# Patient Record
Sex: FEMALE | Race: WHITE | NOT HISPANIC OR LATINO | Employment: FULL TIME | URBAN - METROPOLITAN AREA
[De-identification: names, ages, dates, MRNs, and addresses within clinical notes are randomized per-mention and may not be internally consistent; named-entity substitution may affect disease eponyms.]

---

## 2018-04-13 ENCOUNTER — OFFICE VISIT (OUTPATIENT)
Dept: MEDICAL GROUP | Age: 38
End: 2018-04-13
Payer: COMMERCIAL

## 2018-04-13 VITALS
HEART RATE: 97 BPM | BODY MASS INDEX: 21.66 KG/M2 | DIASTOLIC BLOOD PRESSURE: 72 MMHG | WEIGHT: 130 LBS | HEIGHT: 65 IN | OXYGEN SATURATION: 99 % | SYSTOLIC BLOOD PRESSURE: 132 MMHG | TEMPERATURE: 99.3 F

## 2018-04-13 DIAGNOSIS — Z00.00 PREVENTATIVE HEALTH CARE: ICD-10-CM

## 2018-04-13 DIAGNOSIS — J45.20 MILD INTERMITTENT ASTHMA WITHOUT COMPLICATION: ICD-10-CM

## 2018-04-13 DIAGNOSIS — F32.4 MAJOR DEPRESSIVE DISORDER WITH SINGLE EPISODE, IN PARTIAL REMISSION (HCC): ICD-10-CM

## 2018-04-13 DIAGNOSIS — E78.00 PURE HYPERCHOLESTEROLEMIA: ICD-10-CM

## 2018-04-13 DIAGNOSIS — R40.0 DAYTIME SOMNOLENCE: ICD-10-CM

## 2018-04-13 PROCEDURE — 99214 OFFICE O/P EST MOD 30 MIN: CPT | Performed by: FAMILY MEDICINE

## 2018-04-13 RX ORDER — ALBUTEROL SULFATE 90 UG/1
2 AEROSOL, METERED RESPIRATORY (INHALATION) EVERY 6 HOURS PRN
Qty: 8.5 G | Refills: 3 | Status: SHIPPED | OUTPATIENT
Start: 2018-04-13

## 2018-04-13 ASSESSMENT — PATIENT HEALTH QUESTIONNAIRE - PHQ9
SUM OF ALL RESPONSES TO PHQ QUESTIONS 1-9: 9
5. POOR APPETITE OR OVEREATING: 0 - NOT AT ALL
CLINICAL INTERPRETATION OF PHQ2 SCORE: 1

## 2018-04-13 NOTE — PROGRESS NOTES
This medical record contains text that has been entered with the assistance of computer voice recognition and dictation software.  Therefore, it may contain unintended errors in text, spelling, punctuation, or grammar    Chief Complaint   Patient presents with   • Ankle Injury     Left, injured in 2016   • Sleep Disruption       Allison Kulkarni is a 38 y.o. female here evaluation and management of: Establish care, didn't somnolence, depression and anxiety, asthma, seasonal allergies      HPI:     Daytime somnolence  The patient is a 38-year-old female who presents to clinic with chief complaint of severe daytime fatigue. Sometimes when she is laughing uncontrollably she'll feel her knees give out and she'll fall. She also complains of hearing things that are not there when she is falling asleep. She denies any hypnopompic hallucinations.        Depression  Effexor Effexor 75mg po q24h  Mirtazipine 15mg po q24h    Mother commited sucicide at age 58 (unknown reason)  They were still , she tried to hang her self failed then used 350 magnum, August, 2017    Dr. Garcia is her psychiatrist q 2months    DEPRESSION SCREEN  Denied all of the following,  Depressed mood  Loss of interest or pleasure in nearly all activities  Changes in appetite or weight  Insomnia or hypersomnia,  Psychomotor agitation or retardation,  Fatigue or loss of energy,  Feelings of worthlessness or guilt,  Difficulty thinking, concentrating, or making decisions,  Recurrent thoughts of death or suicidal ideation, plans, or attempts   No Early Morning Awakenings    Preventative health care  Patient is a 38-year-old female who presents to clinic to establish care. She has a significant past medical history of asthma mild intermittent, daytime somnolence, depression and anxiety, seasonal allergies.   The patient denied any chest pain, no sob, no michelle, no  pnd, no orthopnea, no headache, no changes in vision, no numbness or tingling, no nausea, no  diarrhea, no abdominal pain, no fevers, no chills, no bright red blood per rectum, no  difficulty urinating, no burning during micturition, no depressed mood, no other concerns.        Father with MI at age 42  PGM with CHF, age uknown    Is      Asthma  The patient states that she only has symptoms when she is outdoors for long periods of time for example when she goes camping in the woods. Otherwise she doesn't use this rescue inhaler often. the patient denied intermittent cough,no nocturnal cough, wheeze, and shortness of breath brought on by characteristic triggers , no Respiratory symptoms triggered by exercise, cold air, and exposure to inhaled allergens      Current medicines (including changes today)  Current Outpatient Prescriptions   Medication Sig Dispense Refill   • albuterol (VENTOLIN HFA) 108 (90 Base) MCG/ACT Aero Soln inhalation aerosol Inhale 2 Puffs by mouth every 6 hours as needed. 8.5 g 3   • venlafaxine (EFFEXOR-XR) 150 MG extended-release capsule Take 1 Cap by mouth every day. (Patient taking differently: Take 75 mg by mouth every day.) 30 Cap 5   • mirtazapine (REMERON) 30 MG TABS tablet Take 2 Tabs by mouth every bedtime. (Patient taking differently: Take 15 mg by mouth every bedtime.) 60 Tab 5   • fluticasone (FLONASE) 50 MCG/ACT nasal spray Spray 1 Spray in nose every day. 16 g 5   • Levalbuterol HCl (XOPENEX INH) Inhale  by mouth.     • benzonatate (TESSALON) 100 MG CAPS Take 1 Cap by mouth 3 times a day as needed for Cough. 15 Cap 0     No current facility-administered medications for this visit.      She  has a past medical history of Allergy; Asthma (3/27/2015); Depression; and Seasonal allergies (3/27/2015). She also has no past medical history of ASTHMA.  She  has no past surgical history on file.  Social History   Substance Use Topics   • Smoking status: Former Smoker     Quit date: 3/27/2014   • Smokeless tobacco: Never Used   • Alcohol use Yes      Comment: occasionally  "    Social History     Social History Narrative   • No narrative on file     Family History   Problem Relation Age of Onset   • Heart Attack Father    • Heart Failure Paternal Grandmother    • Heart Attack Paternal Grandfather      Family Status   Relation Status   • Mother Alive   • Father Alive   • Maternal Grandmother    • Maternal Grandfather Alive   • Paternal Grandmother Alive   • Paternal Grandfather          ROS    Please see hpi     All other systems reviewed and are negative     Objective:     Blood pressure 132/72, pulse 97, temperature 37.4 °C (99.3 °F), height 1.651 m (5' 5\"), weight 59 kg (130 lb), SpO2 99 %, not currently breastfeeding. Body mass index is 21.63 kg/m².  Physical Exam:    Constitutional: Alert, no distress.  Skin: Warm, dry, good turgor, no rashes in visible areas.  Eye: Equal, round and reactive, conjunctiva clear, lids normal.  ENMT: Lips without lesions, good dentition, oropharynx clear.  Neck: Trachea midline, no masses, no thyromegaly. No cervical or supraclavicular lymphadenopathy.  Respiratory: Unlabored respiratory effort, lungs clear to auscultation, no wheezes, no ronchi.  Cardiovascular: Normal S1, S2, no murmur, no edema.  Abdomen: Soft, non-tender, no masses, no hepatosplenomegaly.  Psych: Alert and oriented x3, normal affect and mood.          Assessment and Plan:   The following treatment plan was discussed      1. Daytime somnolence  Clinically suspicious for narcolepsy  We will obtain sleep studies in pulmonology next time and consider stimulant    - REFERRAL TO PULMONOLOGY    2. Major depressive disorder with single episode, in partial remission ([facility])  Patient has been stable with current management  We will make no changes for now      3. Preventative health care  Care has been established  We need baseline labs to establish a clinical profile  We reviewed USPSTF guidelines    Requested Medical records to be sent to us  Denies intimate partner " "viloence      4. Pure hypercholesterolemia  Need new labs    - CBC WITH DIFFERENTIAL; Future  - LIPID PROFILE; Future  - COMP METABOLIC PANEL; Future  - TSH WITH REFLEX TO FT4; Future    5. Mild intermittent asthma without complication    Discussed the goals of treatment,    #1Routine monitoring of symptoms and lung function--Peak expiratory flow rate (PEFR) (performed in the office and/or at home) and spirometry (performed in the office)  #2 Patient education on Asthma Action plan  #3 Controlling environmental factors (trigger factors) and comorbid conditions that contribute to asthma severity  #4 Pharmacologic therapy--Rescue inhaler as needed and daily Advair  #5 Well-controlled asthma is characterized by daytime symptoms no more than twice per week and nighttime symptoms no more than twice per month.  #6Asthma action plan -- The patient's normal PEFR value can be used to maintain personalized \"asthma action plan\"  #7The identification and avoidance of asthma \"triggers\" is a critical component of successful asthma management  #8 Non-selective beta-blockers can trigger severe asthmatic attacks      - albuterol (VENTOLIN HFA) 108 (90 Base) MCG/ACT Aero Soln inhalation aerosol; Inhale 2 Puffs by mouth every 6 hours as needed.  Dispense: 8.5 g; Refill: 3        HEALTH MAINTENANCE:    Instructed to Follow up in clinic or ER for worsening symptoms, difficulty breathing, lack of expected recovery, or should new symptoms or problems arise.    Followup: Return in about 4 weeks (around 5/11/2018) for EXCISION.       Once again this medical record contains text that has been entered with the assistance of computer voice recognition and dictation software.  Therefore, it may contain unintended errors in text, spelling, punctuation, or grammar         "

## 2018-04-13 NOTE — ASSESSMENT & PLAN NOTE
Effexor Effexor 75mg po q24h  Mirtazipine 15mg po q24h    Mother commited sucicide at age 58 (unknown reason)  They were still , she tried to hang her self failed then used 350 magnum, August, 2017    Dr. Garcia is her psychiatrist q 2months    DEPRESSION SCREEN  Denied all of the following,  Depressed mood  Loss of interest or pleasure in nearly all activities  Changes in appetite or weight  Insomnia or hypersomnia,  Psychomotor agitation or retardation,  Fatigue or loss of energy,  Feelings of worthlessness or guilt,  Difficulty thinking, concentrating, or making decisions,  Recurrent thoughts of death or suicidal ideation, plans, or attempts   No Early Morning Awakenings

## 2018-04-13 NOTE — ASSESSMENT & PLAN NOTE
The patient states that she only has symptoms when she is outdoors for long periods of time for example when she goes camping in the woods. Otherwise she doesn't use this rescue inhaler often. the patient denied intermittent cough,no nocturnal cough, wheeze, and shortness of breath brought on by characteristic triggers , no Respiratory symptoms triggered by exercise, cold air, and exposure to inhaled allergens

## 2018-04-13 NOTE — ASSESSMENT & PLAN NOTE
Patient is a 38-year-old female who presents to clinic to establish care. She has a significant past medical history of asthma mild intermittent, daytime somnolence, depression and anxiety, seasonal allergies.   The patient denied any chest pain, no sob, no michelle, no  pnd, no orthopnea, no headache, no changes in vision, no numbness or tingling, no nausea, no diarrhea, no abdominal pain, no fevers, no chills, no bright red blood per rectum, no  difficulty urinating, no burning during micturition, no depressed mood, no other concerns.        Father with MI at age 42  PGM with CHF, age uknown    Is

## 2018-04-13 NOTE — ASSESSMENT & PLAN NOTE
The patient is a 38-year-old female who presents to clinic with chief complaint of severe daytime fatigue. Sometimes when she is laughing uncontrollably she'll feel her knees give out and she'll fall. She also complains of hearing things that are not there when she is falling asleep. She denies any hypnopompic hallucinations.

## 2018-04-18 DIAGNOSIS — R40.0 DAYTIME SOMNOLENCE: ICD-10-CM

## 2018-04-20 ENCOUNTER — SLEEP CENTER VISIT (OUTPATIENT)
Dept: SLEEP MEDICINE | Facility: MEDICAL CENTER | Age: 38
End: 2018-04-20
Payer: COMMERCIAL

## 2018-04-20 VITALS
DIASTOLIC BLOOD PRESSURE: 72 MMHG | SYSTOLIC BLOOD PRESSURE: 118 MMHG | OXYGEN SATURATION: 99 % | HEART RATE: 80 BPM | HEIGHT: 65 IN | WEIGHT: 129 LBS | RESPIRATION RATE: 16 BRPM | BODY MASS INDEX: 21.49 KG/M2

## 2018-04-20 DIAGNOSIS — G47.10 HYPERSOMNOLENCE DISORDER, PERSISTENT: ICD-10-CM

## 2018-04-20 DIAGNOSIS — F32.9 REACTIVE DEPRESSION: ICD-10-CM

## 2018-04-20 PROCEDURE — 99243 OFF/OP CNSLTJ NEW/EST LOW 30: CPT | Performed by: INTERNAL MEDICINE

## 2018-04-20 ASSESSMENT — ENCOUNTER SYMPTOMS: SLEEP DISTURBANCE: 0

## 2018-04-20 NOTE — PROGRESS NOTES
"Chief Complaint   Patient presents with   • New Patient     Sleep Consultation       HPI: This patient is a 38 y.o. Female who is referred for hypersomnolence. She describes \"always feeling tired\" with frequent nighttime awakenings. She dates the onset of symptoms since puberty. Sometimes when she is laughing uncontrollably her legs give out and she falls. This has also happened to her with \"panic attacks\". She sees or hears things that are not there when she is falling asleep. In terms of sleep habits, she goes to bed by 8 PM, falling asleep within 15 minutes. She currently takes mirtazapine at bedtime. She will awaken within 2 hours, and has subsequent frequent brief nighttime awakenings for no specific reason. She estimates this happens up to 6 times nightly. Denies snoring, restless legs or apneas. She gets up by 4:45 AM, feeling tired. She drinks 2 large mugs of coffee daily. She feels drowsy through the day however denies sleep attacks. Denies tobacco, alcohol, or recreational drug use. She has been prescribed Effexor and mirtazapine for depression over the past 6 months. She denies any change in symptomatology with these medications.  She had been evaluated through [facility] for narcolepsy evaluation in the past however never pursued her sleep study.      Past Medical History:   Diagnosis Date   • Allergy    • Asthma 3/27/2015   • Back pain    • Bronchitis    • Chickenpox    • Depression    • Nasal drainage    • Seasonal allergies 3/27/2015       Social History     Social History   • Marital status: Single     Spouse name: N/A   • Number of children: N/A   • Years of education: N/A     Occupational History   • Not on file.     Social History Main Topics   • Smoking status: Former Smoker     Packs/day: 0.50     Years: 10.00     Quit date: 3/27/2014   • Smokeless tobacco: Never Used      Comment: currently uses vape   • Alcohol use Yes      Comment: occasionally   • Drug use: No   • Sexual activity: Not on file " "    Other Topics Concern   • Not on file     Social History Narrative   • No narrative on file       Family History   Problem Relation Age of Onset   • Other Mother      sleep walking   • Heart Attack Father    • Heart Failure Paternal Grandmother    • Heart Attack Paternal Grandfather        Current Outpatient Prescriptions on File Prior to Visit   Medication Sig Dispense Refill   • venlafaxine (EFFEXOR-XR) 150 MG extended-release capsule Take 1 Cap by mouth every day. (Patient taking differently: Take 75 mg by mouth every day.) 30 Cap 5   • mirtazapine (REMERON) 30 MG TABS tablet Take 2 Tabs by mouth every bedtime. (Patient taking differently: Take 15 mg by mouth every bedtime.) 60 Tab 5   • albuterol (VENTOLIN HFA) 108 (90 Base) MCG/ACT Aero Soln inhalation aerosol Inhale 2 Puffs by mouth every 6 hours as needed. 8.5 g 3   • fluticasone (FLONASE) 50 MCG/ACT nasal spray Spray 1 Spray in nose every day. 16 g 5   • benzonatate (TESSALON) 100 MG CAPS Take 1 Cap by mouth 3 times a day as needed for Cough. 15 Cap 0     No current facility-administered medications on file prior to visit.        Allergies: Latex    ROS:   Constitutional: Denies fevers, chills, night sweats, fatigue or weight loss  Eyes: Denies vision loss, pain, drainage, double vision  Ears, Nose, Throat: Denies earache, difficulty hearing, tinnitus, nasal congestion, hoarseness  Cardiovascular: Denies chest pain, tightness, palpitations, orthopnea or edema  Respiratory: Denies shortness of breath, cough, wheezing, hemoptysis  Sleep: As in history of present illness  GI: Denies heartburn, dysphagia, nausea, abdominal pain, diarrhea or constipation  : Denies frequent urination, hematuria, discharge or painful urination  Musculoskeletal: Denies back pain, painful joints, sore muscles  Neurological: Denies weakness or headaches  Skin: No rashes    Blood pressure 118/72, pulse 80, resp. rate 16, height 1.651 m (5' 5\"), weight 58.5 kg (129 lb), SpO2 99 " %.    Physical Exam:  Appearance: Well-nourished, well-developed, thin, in no acute distress  HEENT: Normocephalic, atraumatic, white sclera, PERRLA, oropharynx clear, Mallampati 2  Neck: No adenopathy or masses  Respiratory: no intercostal retractions or accessory muscle use  Lungs auscultation: Clear to auscultation bilaterally  Cardiovascular: Regular rate rhythm. No murmurs, rubs or gallops.  No LE edema  Abdomen: soft, nondistended  Gait: Normal  Digits: No clubbing, cyanosis  Motor: No focal deficits  Orientation: Oriented to time, person and place    Diagnosis:  1. Hypersomnolence disorder, persistent  NARCOLEPSY GENOTYPING    MULTIPLE SLEEP LATENCY TEST    POLYSOMNOGRAPHY, 4 OR MORE   2. Reactive depression         Plan:  The patient describes daytime hypersomnolence, hypnagogic hallucinations and cataplexy symptoms, suspicious for narcolepsy. She is currently on mirtazapine and effexor which can cause sedation, however her symptoms have long preceded the use of these medications. She has skipped mirtazapine previously for a day without any adverse effects, and will hold off on mirtazapine the night of her sleep study.  Recommend evaluation with baseline polysomnography followed by multiple sleep latency testing. Obtain narcolepsy genotyping.  She is cautioned against driving when hypersomnolent.  Return for after sleep study/MSLT.      Answers for HPI/ROS submitted by the patient on 4/20/2018   Year of your last physical exam: 2018  Occupation :   Height: 5ft 5in  Current weight: 130  6 months ago: 120  At age 21: 105  What is the reason for your visit today?: Narcolepsy evaluation  Name of person referring you to the Sleep Center: Dr. Campbell  Have you ever been hospitalized?: No  Have you ever had problems with anesthesia?: Yes  Have you experienced post-operative delirium?: No  Any complications with surgery?: No  What year did you receive your last Flu shot?: Never  What year did you receive  you last Pneumonia shot?: Never  Have you had a TB skin test? If so, please list the year and result:: No  Have you had Allergy skin testing? If so, please list the year and result:: No  Please briefly describe your sleep problem and how old you were when it began.: always tired, hypnogogic hallucinations, frequent waking. Began as a teenager.  How does this affect your daily life and activities? Please also rate how serious of a problem this is (1 = Not at all, 10 = Very Serious).: 7  Have you had any previous evaluations, examinations, or treatment for this sleep problem or any other problems with your sleep? If so, please describe the evaluation, treatment, and results.: Saw a sleep specialist who thought I might have narcolepsy, but was unable to do the sleep study at that time.  Have you used any medications (prescribed or otherwise) to help your sleep problem? If yes, include name, amount, frequency, and the prescribing physician.: mirtazipine, 15-30mg nightly.  If employed, what time do you usually start and end work?: 6:15 am to 5:15 pm (4 days a week)  Do you ever change work shifts? If yes, describe how often (never, infrequently, regularly).: only by an hour or two.  What time do you usually go to bed and wake up on: Weekdays? Weekends?: Work week - bed at 8pm, wake at 4:45am.  Weekends bed at 9 or 10pm, wake between 7 and 8am.  Do you have a regular bed partner?: Yes  How many minutes does it usually take to fall asleep at night after turning off the lights?: 15-20  What do you ordinarily do just prior to turning out the lights and attempting to go to sleep (e.g., reading, TV, baths, etc.)?: Look at my phone (next day's weather, facebook, etc)  On average, how many times do you wake up during the night?: 3-6  On average, how many times do you wake up to use the bathroom?: I usually don't  Do you often wake up too early in the morning and are unable to return to sleep?: no  On average, how many hours of  sleep do you get per night?: 8.5  not counting the times I wake up.  How do you usually awaken?  Alarm, spontaneously, or other?: Alarm  Is it difficult for you to awaken and get out of bed after sleeping? (Not at all, Sometimes, Very): Sometimes  Do you nap or return to bed after arising?: No  Are you bothered by sleepiness during the day?: Yes  Do you feel that you get too much sleep at night?: No  Do you feel that you get too little sleep at night?: Yes  Do you usually feel tired during the day? If so, what do you attribute this to?: Yes. Poor sleep  Do you find yourself falling asleep when you don't mean to? : Very rarely  If yes, how long does your sleep episode last?: A few minutes  Do you feel rested or refreshed after the sleep episode?: No  Have you ever suddenly fallen?: Yes  Have you ever experienced sudden body weakness?: Yes  If yes, were you aware of the things around you?: Yes  Was the weakness brought on by any particular event or feeling? If so, briefly describe.: Laughing very hard, panic attacks.  Have you ever experienced weakness or paralysis upon going to sleep?: Yes  Have you ever experienced weakness or paralysis upon awakening from sleep?: Yes  If yes for either of the above two questions, please indicate how many times per week does this occur?: A couple times a week when falling asleep. Rarely upon waking.  Have you ever experienced seeing things or hearing voices/noises: That weren't real? On going to sleep? During the night? On awakening from sleep? During the day?: Yes, when going to sleep.  Do you have difficulty breathing at night? If yes, briefly describe.: No  Have you been told you snore while asleep? If so, does it disturb a bed partner (or someone in the same room), or someone in the next room?: Only when I have a cold.  Have you ever experienced doing something without being aware of the action? If yes, please describe.: No  Have you ever experienced upon lying in bed before  "sleep or on awakening from sleep: Restlessness of legs, \"nervous legs\", \"creeping crawling\" sensation of legs, or twitching of legs?: Yes  How many times per week does this occur, and how many minutes does the sensation last?: Occasionally, maybe once a month or so  Does anything relieve the sensations (e.g., getting out of bed, medication, massage)?: Yes  At what age did this first occur, and how many years has this occurred?: Started in my early twenties  Have you ever been told that your arms or legs jerk or twitch while you are asleep? If yes, how many times per night does this occur?: Yes. Not every night  At what age did this first occur, and how many years has this occurred?: Started in my early twenties  Does this seem to awaken you from your sleep?: No  Do you know, or have you ever been told that you do any of the following while sleeping: talk, walk, grit teeth, wet the bed, wake up screaming or seemingly afraid, have disturbing dreams, have unusual movements, wake up with headaches, (males) have erections? If yes to any of these, please indicate how many times per week, age started, last occurrence, treatment received.: I sometimes talk in my sleep, maybe once a week. I used to sleep walk occasionally, started around age 12 or 13, stopped around age 22 or 23.  Has anyone in your family been known to have any sleep problems? If yes, please list the type of problem (e.g., trouble getting to sleep, too sleepy, bed wetting, etc.), the relationship of this person to you, and the treatment received.: My mother used to sleep walk, stopped in her twenties    Answers for HPI/ROS submitted by the patient on 4/20/2018   Year of your last physical exam: 2018  Occupation :   Height: 5ft 5in  Current weight: 130  6 months ago: 120  At age 21: 105  What is the reason for your visit today?: Narcolepsy evaluation  Name of person referring you to the Sleep Center: Dr. Campbell  Have you ever been hospitalized?: " No  Have you ever had problems with anesthesia?: Yes  Have you experienced post-operative delirium?: No  Any complications with surgery?: No  What year did you receive your last Flu shot?: Never  What year did you receive you last Pneumonia shot?: Never  Have you had a TB skin test? If so, please list the year and result:: No  Have you had Allergy skin testing? If so, please list the year and result:: No  Please briefly describe your sleep problem and how old you were when it began.: always tired, hypnogogic hallucinations, frequent waking. Began as a teenager.  How does this affect your daily life and activities? Please also rate how serious of a problem this is (1 = Not at all, 10 = Very Serious).: 7  Have you had any previous evaluations, examinations, or treatment for this sleep problem or any other problems with your sleep? If so, please describe the evaluation, treatment, and results.: Saw a sleep specialist who thought I might have narcolepsy, but was unable to do the sleep study at that time.  Have you used any medications (prescribed or otherwise) to help your sleep problem? If yes, include name, amount, frequency, and the prescribing physician.: mirtazipine, 15-30mg nightly.  If employed, what time do you usually start and end work?: 6:15 am to 5:15 pm (4 days a week)  Do you ever change work shifts? If yes, describe how often (never, infrequently, regularly).: only by an hour or two.  What time do you usually go to bed and wake up on: Weekdays? Weekends?: Work week - bed at 8pm, wake at 4:45am.  Weekends bed at 9 or 10pm, wake between 7 and 8am.  Do you have a regular bed partner?: Yes  How many minutes does it usually take to fall asleep at night after turning off the lights?: 15-20  What do you ordinarily do just prior to turning out the lights and attempting to go to sleep (e.g., reading, TV, baths, etc.)?: Look at my phone (next day's weather, facebook, etc)  On average, how many times do you wake up  during the night?: 3-6  On average, how many times do you wake up to use the bathroom?: I usually don't  Do you often wake up too early in the morning and are unable to return to sleep?: no  On average, how many hours of sleep do you get per night?: 8.5  not counting the times I wake up.  How do you usually awaken?  Alarm, spontaneously, or other?: Alarm  Is it difficult for you to awaken and get out of bed after sleeping? (Not at all, Sometimes, Very): Sometimes  Do you nap or return to bed after arising?: No  Are you bothered by sleepiness during the day?: Yes  Do you feel that you get too much sleep at night?: No  Do you feel that you get too little sleep at night?: Yes  Do you usually feel tired during the day? If so, what do you attribute this to?: Yes. Poor sleep  Do you find yourself falling asleep when you don't mean to? : Very rarely  If yes, how long does your sleep episode last?: A few minutes  Do you feel rested or refreshed after the sleep episode?: No  Have you ever suddenly fallen?: Yes  Have you ever experienced sudden body weakness?: Yes  If yes, were you aware of the things around you?: Yes  Was the weakness brought on by any particular event or feeling? If so, briefly describe.: Laughing very hard, panic attacks.  Have you ever experienced weakness or paralysis upon going to sleep?: Yes  Have you ever experienced weakness or paralysis upon awakening from sleep?: Yes  If yes for either of the above two questions, please indicate how many times per week does this occur?: A couple times a week when falling asleep. Rarely upon waking.  Have you ever experienced seeing things or hearing voices/noises: That weren't real? On going to sleep? During the night? On awakening from sleep? During the day?: Yes, when going to sleep.  Do you have difficulty breathing at night? If yes, briefly describe.: No  Have you been told you snore while asleep? If so, does it disturb a bed partner (or someone in the same  "room), or someone in the next room?: Only when I have a cold.  Have you ever experienced doing something without being aware of the action? If yes, please describe.: No  Have you ever experienced upon lying in bed before sleep or on awakening from sleep: Restlessness of legs, \"nervous legs\", \"creeping crawling\" sensation of legs, or twitching of legs?: Yes  How many times per week does this occur, and how many minutes does the sensation last?: Occasionally, maybe once a month or so  Does anything relieve the sensations (e.g., getting out of bed, medication, massage)?: Yes  At what age did this first occur, and how many years has this occurred?: Started in my early twenties  Have you ever been told that your arms or legs jerk or twitch while you are asleep? If yes, how many times per night does this occur?: Yes. Not every night  At what age did this first occur, and how many years has this occurred?: Started in my early twenties  Does this seem to awaken you from your sleep?: No  Do you know, or have you ever been told that you do any of the following while sleeping: talk, walk, grit teeth, wet the bed, wake up screaming or seemingly afraid, have disturbing dreams, have unusual movements, wake up with headaches, (males) have erections? If yes to any of these, please indicate how many times per week, age started, last occurrence, treatment received.: I sometimes talk in my sleep, maybe once a week. I used to sleep walk occasionally, started around age 12 or 13, stopped around age 22 or 23.  Has anyone in your family been known to have any sleep problems? If yes, please list the type of problem (e.g., trouble getting to sleep, too sleepy, bed wetting, etc.), the relationship of this person to you, and the treatment received.: My mother used to sleep walk, stopped in her twenties    "

## 2018-04-27 ENCOUNTER — HOSPITAL ENCOUNTER (OUTPATIENT)
Dept: LAB | Facility: MEDICAL CENTER | Age: 38
End: 2018-04-27
Attending: INTERNAL MEDICINE
Payer: COMMERCIAL

## 2018-04-27 DIAGNOSIS — G47.10 HYPERSOMNOLENCE DISORDER, PERSISTENT: ICD-10-CM

## 2018-04-27 PROCEDURE — 81383 HLA II TYPING 1 ALLELE HR: CPT

## 2018-04-27 PROCEDURE — 36415 COLL VENOUS BLD VENIPUNCTURE: CPT

## 2018-05-03 LAB
HLA-DQB1 QL: NEGATIVE
NARCOLEPSY SPEC Q0167: NORMAL

## 2018-05-04 ENCOUNTER — HOSPITAL ENCOUNTER (OUTPATIENT)
Dept: LAB | Facility: MEDICAL CENTER | Age: 38
End: 2018-05-04
Attending: FAMILY MEDICINE
Payer: COMMERCIAL

## 2018-05-04 DIAGNOSIS — E78.00 PURE HYPERCHOLESTEROLEMIA: ICD-10-CM

## 2018-05-04 LAB
ALBUMIN SERPL BCP-MCNC: 3.7 G/DL (ref 3.2–4.9)
ALBUMIN/GLOB SERPL: 1.2 G/DL
ALP SERPL-CCNC: 44 U/L (ref 30–99)
ALT SERPL-CCNC: 23 U/L (ref 2–50)
ANION GAP SERPL CALC-SCNC: 6 MMOL/L (ref 0–11.9)
AST SERPL-CCNC: 17 U/L (ref 12–45)
BASOPHILS # BLD AUTO: 0.8 % (ref 0–1.8)
BASOPHILS # BLD: 0.05 K/UL (ref 0–0.12)
BILIRUB SERPL-MCNC: 0.6 MG/DL (ref 0.1–1.5)
BUN SERPL-MCNC: 15 MG/DL (ref 8–22)
CALCIUM SERPL-MCNC: 8.7 MG/DL (ref 8.5–10.5)
CHLORIDE SERPL-SCNC: 107 MMOL/L (ref 96–112)
CHOLEST SERPL-MCNC: 133 MG/DL (ref 100–199)
CO2 SERPL-SCNC: 26 MMOL/L (ref 20–33)
CREAT SERPL-MCNC: 0.65 MG/DL (ref 0.5–1.4)
EOSINOPHIL # BLD AUTO: 0.15 K/UL (ref 0–0.51)
EOSINOPHIL NFR BLD: 2.5 % (ref 0–6.9)
ERYTHROCYTE [DISTWIDTH] IN BLOOD BY AUTOMATED COUNT: 40.9 FL (ref 35.9–50)
GLOBULIN SER CALC-MCNC: 3 G/DL (ref 1.9–3.5)
GLUCOSE SERPL-MCNC: 88 MG/DL (ref 65–99)
HCT VFR BLD AUTO: 41.8 % (ref 37–47)
HDLC SERPL-MCNC: 66 MG/DL
HGB BLD-MCNC: 13.5 G/DL (ref 12–16)
IMM GRANULOCYTES # BLD AUTO: 0.02 K/UL (ref 0–0.11)
IMM GRANULOCYTES NFR BLD AUTO: 0.3 % (ref 0–0.9)
LDLC SERPL CALC-MCNC: 58 MG/DL
LYMPHOCYTES # BLD AUTO: 2.44 K/UL (ref 1–4.8)
LYMPHOCYTES NFR BLD: 40.9 % (ref 22–41)
MCH RBC QN AUTO: 28.2 PG (ref 27–33)
MCHC RBC AUTO-ENTMCNC: 32.3 G/DL (ref 33.6–35)
MCV RBC AUTO: 87.3 FL (ref 81.4–97.8)
MONOCYTES # BLD AUTO: 0.54 K/UL (ref 0–0.85)
MONOCYTES NFR BLD AUTO: 9 % (ref 0–13.4)
NEUTROPHILS # BLD AUTO: 2.77 K/UL (ref 2–7.15)
NEUTROPHILS NFR BLD: 46.5 % (ref 44–72)
NRBC # BLD AUTO: 0 K/UL
NRBC BLD-RTO: 0 /100 WBC
PLATELET # BLD AUTO: 282 K/UL (ref 164–446)
PMV BLD AUTO: 10.9 FL (ref 9–12.9)
POTASSIUM SERPL-SCNC: 4 MMOL/L (ref 3.6–5.5)
PROT SERPL-MCNC: 6.7 G/DL (ref 6–8.2)
RBC # BLD AUTO: 4.79 M/UL (ref 4.2–5.4)
SODIUM SERPL-SCNC: 139 MMOL/L (ref 135–145)
TRIGL SERPL-MCNC: 43 MG/DL (ref 0–149)
TSH SERPL DL<=0.005 MIU/L-ACNC: 1.22 UIU/ML (ref 0.38–5.33)
WBC # BLD AUTO: 6 K/UL (ref 4.8–10.8)

## 2018-05-04 PROCEDURE — 84443 ASSAY THYROID STIM HORMONE: CPT

## 2018-05-04 PROCEDURE — 80061 LIPID PANEL: CPT

## 2018-05-04 PROCEDURE — 85025 COMPLETE CBC W/AUTO DIFF WBC: CPT

## 2018-05-04 PROCEDURE — 36415 COLL VENOUS BLD VENIPUNCTURE: CPT

## 2018-05-04 PROCEDURE — 80053 COMPREHEN METABOLIC PANEL: CPT

## 2018-05-18 ENCOUNTER — HOSPITAL ENCOUNTER (OUTPATIENT)
Facility: MEDICAL CENTER | Age: 38
End: 2018-05-18
Attending: FAMILY MEDICINE
Payer: COMMERCIAL

## 2018-05-18 ENCOUNTER — OFFICE VISIT (OUTPATIENT)
Dept: MEDICAL GROUP | Age: 38
End: 2018-05-18
Payer: COMMERCIAL

## 2018-05-18 VITALS
OXYGEN SATURATION: 92 % | BODY MASS INDEX: 21.66 KG/M2 | TEMPERATURE: 99 F | SYSTOLIC BLOOD PRESSURE: 124 MMHG | HEIGHT: 65 IN | DIASTOLIC BLOOD PRESSURE: 62 MMHG | WEIGHT: 130 LBS | HEART RATE: 100 BPM

## 2018-05-18 DIAGNOSIS — D48.9 NEOPLASM OF UNCERTAIN BEHAVIOR: ICD-10-CM

## 2018-05-18 PROCEDURE — 88305 TISSUE EXAM BY PATHOLOGIST: CPT

## 2018-05-18 PROCEDURE — 99000 SPECIMEN HANDLING OFFICE-LAB: CPT | Performed by: FAMILY MEDICINE

## 2018-05-18 PROCEDURE — 11401 EXC TR-EXT B9+MARG 0.6-1 CM: CPT | Performed by: FAMILY MEDICINE

## 2018-05-18 NOTE — ASSESSMENT & PLAN NOTE
The patient is a 38-year-old female who complains of an increasing annoying mole on mid chest which often gets caught in clothing bleeds and is very uncomfortable.  She is also concerned about malignancy and wants this removed.

## 2018-05-18 NOTE — PROGRESS NOTES
This medical record contains text that has been entered with the assistance of computer voice recognition and dictation software.  Therefore, it may contain unintended errors in text, spelling, punctuation, or grammar    Chief Complaint   Patient presents with   • Biopsy         Allison Kulkarni is a 38 y.o. female here evaluation and management of: mole      HPI:     Neoplasm of uncertain behavior  The patient is a 38-year-old female who complains of an increasing annoying mole on mid chest which often gets caught in clothing bleeds and is very uncomfortable.  She is also concerned about malignancy and wants this removed.    Current medicines (including changes today)  Current Outpatient Prescriptions   Medication Sig Dispense Refill   • Multiple Vitamin (MULTI-VITAMIN PO) Take  by mouth.     • Cholecalciferol (VITAMIN D PO) Take  by mouth.     • venlafaxine (EFFEXOR-XR) 150 MG extended-release capsule Take 1 Cap by mouth every day. (Patient taking differently: Take 75 mg by mouth every day.) 30 Cap 5   • mirtazapine (REMERON) 30 MG TABS tablet Take 2 Tabs by mouth every bedtime. (Patient taking differently: Take 15 mg by mouth every bedtime.) 60 Tab 5   • albuterol (VENTOLIN HFA) 108 (90 Base) MCG/ACT Aero Soln inhalation aerosol Inhale 2 Puffs by mouth every 6 hours as needed. 8.5 g 3   • fluticasone (FLONASE) 50 MCG/ACT nasal spray Spray 1 Spray in nose every day. 16 g 5   • benzonatate (TESSALON) 100 MG CAPS Take 1 Cap by mouth 3 times a day as needed for Cough. 15 Cap 0     No current facility-administered medications for this visit.      She  has a past medical history of Allergy; Asthma (3/27/2015); Back pain; Bronchitis; Chickenpox; Depression; Nasal drainage; and Seasonal allergies (3/27/2015). She also has no past medical history of ASTHMA.  She  has no past surgical history on file.  Social History   Substance Use Topics   • Smoking status: Former Smoker     Packs/day: 0.50     Years: 10.00      "Quit date: 3/27/2014   • Smokeless tobacco: Never Used      Comment: currently uses vape   • Alcohol use Yes      Comment: occasionally     Social History     Social History Narrative   • No narrative on file     Family History   Problem Relation Age of Onset   • Other Mother      sleep walking   • Heart Attack Father    • Heart Failure Paternal Grandmother    • Heart Attack Paternal Grandfather      Family Status   Relation Status   • Mother Alive   • Father Alive   • Maternal Grandmother    • Maternal Grandfather Alive   • Paternal Grandmother Alive   • Paternal Grandfather          ROS    Please see hpi     All other systems reviewed and are negative     Objective:     Blood pressure 124/62, pulse 100, temperature 37.2 °C (99 °F), height 1.651 m (5' 5\"), weight 59 kg (130 lb), SpO2 92 %, not currently breastfeeding. Body mass index is 21.63 kg/m².  Physical Exam:    Constitutional: Alert, no distress.  Skin: Warm, dry, good turgor, no rashes in visible areas.  Eye: Equal, round and reactive, conjunctiva clear, lids normal.  ENMT: Lips without lesions, good dentition, oropharynx clear.  Neck: Trachea midline, no masses, no thyromegaly. No cervical or supraclavicular lymphadenopathy.  Respiratory: Unlabored respiratory effort, lungs clear to auscultation, no wheezes, no ronchi.  Cardiovascular: Normal S1, S2, no murmur, no edema.  Abdomen: Soft, non-tender, no masses, no hepatosplenomegaly.  Psych: Alert and oriented x3, normal affect and mood.        Excision---4mm, irregular, assymmetric, hyperpigmented papule on mid chest    Dermoscopy revealed  Pigmented  globules, blotches,     Final Length of Excision--6mm        After informed written consent was obtained, using Betadine for cleansing and 1% Lidocaine w- epinephrine for anesthetic, with sterile technique a 6 mm punch tool was used to obtain and excise  the lesion through dermis (full thickness) . Intent was to remove entire lesion with " margins . Hemostasis was obtained by pressure and wound was   Sutured  With 3.0 Ethilon x2  Antibiotic dressing is applied, and wound care instructions provided. Be alert for any signs of cutaneous infection. The specimen is labeled and sent to pathology for evaluation. The procedure was well tolerated without complications.    Assessment and Plan:   The following treatment plan was discussed      1. Neoplasm of uncertain behavior    Patient tollerrated procedure well  There were no adverse events  Patient was given post procedure precautions     - PATHOLOGY SPECIMEN; Future        HEALTH MAINTENANCE:    Instructed to Follow up in clinic or ER for worsening symptoms, difficulty breathing, lack of expected recovery, or should new symptoms or problems arise.    Followup: Return in about 10 days (around 5/28/2018) for Suture Removal with MA.       Once again this medical record contains text that has been entered with the assistance of computer voice recognition and dictation software.  Therefore, it may contain unintended errors in text, spelling, punctuation, or grammar

## 2018-05-21 ENCOUNTER — SLEEP STUDY (OUTPATIENT)
Dept: SLEEP MEDICINE | Facility: MEDICAL CENTER | Age: 38
End: 2018-05-21
Attending: INTERNAL MEDICINE
Payer: COMMERCIAL

## 2018-05-21 DIAGNOSIS — G47.10 HYPERSOMNOLENCE DISORDER, PERSISTENT: ICD-10-CM

## 2018-05-21 PROCEDURE — 95810 POLYSOM 6/> YRS 4/> PARAM: CPT | Performed by: FAMILY MEDICINE

## 2018-05-22 ENCOUNTER — SLEEP STUDY (OUTPATIENT)
Dept: SLEEP MEDICINE | Facility: MEDICAL CENTER | Age: 38
End: 2018-05-22
Attending: INTERNAL MEDICINE
Payer: COMMERCIAL

## 2018-05-22 DIAGNOSIS — G47.10 HYPERSOMNOLENCE DISORDER, PERSISTENT: ICD-10-CM

## 2018-05-22 NOTE — PROCEDURES
Clinical Comments:  The patient underwent a comprehensive polysomnogram using the standard montage for measurement of parameters of sleep, respiratory events, movement abnormalities, heart rate and rhythm. A microphone was used to monitor snoring.        INTERPRETATION:  The total recording time was 584.4 minutes with a sleep period of 420.0 minutes and the total sleep time was 402.5 minutes with a sleep efficiency of 68.9%.  The sleep latency was 20.0 minutes, and REM latency was 133.0 minutes.  The patient experienced 170 arousals in total, for an arousal index of 25.3     RESPIRATORY: The patient had 0 apneas in total.  Of these, 0 were obstructive apneas, and 0 were central apneas.  This resulted in an apnea index (AI) of 0.0.  The patient had 2 hypopneas, for a hypopnea index of 0.3.  The overall AHI was 0.3, while the AHI during Stage R sleep was 0.0.  AHI while supine was 5.5.     OXIMETRY: Oxygen saturation monitoring showed a mean SpO2 of 96.4%, with a minimum oxygen saturation of 93.0%.  Oxygen saturations were less than or = 89% for 5.5 minutes of sleep time.     CARDIAC: The highest heart rate during the recording was 100.0 beats per minute.  The average heart rate during sleep was 100.0 bpm.     LIMB MOVEMENTS: There were a total of 242 PLMs during sleep, of which 19 were PLMs arousals.  This resulted in a PLMS index of 36.1.    Technical summary:The patient underwent a diagnostic polysomnogram. This was a 16 channel montage study to include a 6 channel EEG, a 2 channel EOG, and chin EMG, left and right leg EMG, a snore channel, a nasal pressure transducer, and a nasal oral airflow semester.   Respiratory effort was assessed with the use of a thoracic and abdominal monitor and overnight oximetry was obtained. Audio and video recordings were reviewed. This was a fully attended study and sleep stage scoring was performed. The test was technically adequate.     General sleep summary:  General sleep  summary:  During the overnight study, the sleep latency was 75 min which is prolonged. The REM latency was 68 min which is normal. The total sleep time was 402 min and sleep efficiency was 68 % which is decreased.  Sleep stage proportions showed no N3 sleep and increased WASO of 106 min.  In regards to sleep quality there was a moderate degree of sleep fragmentation as shown by the arousal index of 25 an hourThe arousals were due to spontaneous arousal.    Respiratory summary: During the overnight study, the patient demonstrated normal apnea hypopnea index of 0.3/hr. There was a total of 0 apneas and 2 hypopneas. In the supine position the respiratory disturbance index was 0 an hour and in the non-supine position the respiratory disturbance index was 1.1 per hour. The respiratory events were associated with O2 nakul of 93% and average O2 saturation of 96%. She spent 0 % of sleep time below 89% O2 saturation. There was snoring. The patient demonstrated a NSR with an average heartbeat of 53 bpm.     Periodic limb movement summary: The patient demonstrated an mild degree of  limb movements as shown by the LM index of 36.1 per hour.      Impression:  1.  hypersomnolence   2.  Normal AHI   3.  No SOREMPs    Recommendations:  Clinical correlation is required. This study does not suggest clinically significant obstructive sleep apnea  with a normal AHI of 0.3/hr. Pt should be cautioned against drowsy driving and operating heavy machinery. If she feels sleepy while driving, she must pull over for a break/nap, rather than persist on the road, in the interest of her own safety and that of others on the road.

## 2018-05-25 ENCOUNTER — SLEEP CENTER VISIT (OUTPATIENT)
Dept: SLEEP MEDICINE | Facility: MEDICAL CENTER | Age: 38
End: 2018-05-25
Payer: COMMERCIAL

## 2018-05-25 VITALS
BODY MASS INDEX: 21.83 KG/M2 | SYSTOLIC BLOOD PRESSURE: 110 MMHG | DIASTOLIC BLOOD PRESSURE: 72 MMHG | OXYGEN SATURATION: 97 % | HEIGHT: 65 IN | RESPIRATION RATE: 15 BRPM | WEIGHT: 131 LBS | HEART RATE: 72 BPM

## 2018-05-25 DIAGNOSIS — G47.11 IDIOPATHIC HYPERSOMNOLENCE: ICD-10-CM

## 2018-05-25 PROCEDURE — 99213 OFFICE O/P EST LOW 20 MIN: CPT | Performed by: FAMILY MEDICINE

## 2018-05-25 RX ORDER — VENLAFAXINE HYDROCHLORIDE 37.5 MG/1
37.5 CAPSULE, EXTENDED RELEASE ORAL DAILY
Qty: 30 CAP | Refills: 3 | Status: SHIPPED | OUTPATIENT
Start: 2018-05-25 | End: 2018-10-22

## 2018-05-25 RX ORDER — MODAFINIL 100 MG/1
100 TABLET ORAL DAILY
Qty: 30 TAB | Refills: 1 | Status: SHIPPED | OUTPATIENT
Start: 2018-05-25 | End: 2018-06-24

## 2018-05-25 RX ORDER — VENLAFAXINE HYDROCHLORIDE 150 MG/1
150 CAPSULE, EXTENDED RELEASE ORAL DAILY
Qty: 30 CAP | Refills: 3 | Status: SHIPPED | OUTPATIENT
Start: 2018-05-25 | End: 2018-10-22

## 2018-05-25 NOTE — PROCEDURES
[facility]ST[facility]        Recording Technique:  EEG was continuously moniotred from O1-M2,O2-M1,C3-M2,C3-M2,C4-M1,F3-M2 AND F4-M1. The EOGs and EMG was monitored, heart rhythm was monitored by EKG.      Scoring  Nap1: Sleep latency: 4:27 min, NO SOREMP  Nap2: Sleep latency: 6:58 min, No SOREMP  Nap3: Sleep latency: 6:15 min, No SOREMP  Nap4: Sleep latency: 6:02 min, No SOREMP      Mean Sleep latency: 5:55 min   Total # of SOREMPs: 0       Interpretation:    PS) Normal with apnea+hypopnea index (AHI) of 0.3/hour in general. The O2 sat nakul was 93%.     MSLT  1. The Multiple Sleep Latency Test (MSLT) was done on the next day with 4 naps. There was  evidence of hypersomnolence with a short, 4-nap mean sleep latency of 5:55 min.    No sleep onset REM periods (SOREMPs) were seen to suggest narcolepsy.       However, initial MSLT can be negative for SOREMPs in a minority of pts with narcolepsy. She is currently on effexor Xr 150 mg and Remeron and there was no wash out period before the PSG and MSLT    Further clinical correlation is recommended.         Patient didnot submit a sleep log for the two weeks preceding the MSLT.      Urine drug screen on the morning of the MSLT was not done prior to MSLT.

## 2018-05-25 NOTE — PROGRESS NOTES
[facility] Follow Up Note     Date: 5/25/2018 / Time: 11:22 AM    Patient ID:   Name:             Allison Kulkarni   YOB: 1980  Age:                 38 y.o.  female   MRN:               [id number]      Thank you for requesting a sleep medicine consultation on Allison Kulkarni at the sleep center. She presents today with the chief complaints of PSG and MSLT follow up.     HISTORY OF PRESENT ILLNESS:       Pt is currently not on PAP therapy. She goes to sleep around 8:30 pm and wakes up around 4:45 am. She is getting about 6-7 hrs of sleep on a good night and about 3-4 hr of sleep on a bad night. The bad nights are about 1 per month. The symptoms of excessive daytime continues.     Since her last visit she had PSG and MSLT. During the overnight study, the patient demonstrated normal apnea hypopnea index of 0.3/hr. There was a total of 0 apneas and 2 hypopneas. In the supine position the respiratory disturbance index was 0 an hour and in the non-supine position the respiratory disturbance index was 1.1 per hour. The respiratory events were associated with O2 nakul of 93% and average O2 saturation of 96%. She spent 0 % of sleep time below 89% O2 saturation.    MSLT showed sleep latency of 5:15 min however no SPREMPs was seen. The REM latency on PSG was normal as well. She has anxiety disorder and currently on effexor and remeron. She did not have wash out period before her sleep studies.   She has long standing symptoms of daytime sleepiness along with ? Cataplexy like symptoms when she feels like her legs give out when laughing uncontrollably or if she has a panic attack. It happens about once every couple months because she is very conscious about her emotions and helps not to have those. She also has symptoms of sleep paralysis which is a few times a week. Other symptoms include hypnagogic hallucinations which are 1-2 per month. Denies [facility] of narcolepsy.     REVIEW OF  SYSTEMS:       Constitutional: Denies fevers, Denies weight changes  Eyes: Denies changes in vision, no eye pain  Ears/Nose/Throat/Mouth: Denies nasal congestion or sore throat   Cardiovascular: Denies chest pain or palpitations   Respiratory: Denies shortness of breath , Denies cough  Gastrointestinal/Hepatic: Denies abdominal pain, nausea, vomiting, diarrhea, constipation or GI bleeding   Genitourinary: Denies bladder dysfunction, dysuria or frequency  Musculoskeletal/Rheum: Denies  joint pain and swelling   Skin/Breast: Denies rash,   Neurological: Denies headache, confusion, memory loss or focal weakness/parasthesias  Psychiatric: denies mood disorder     Comprehensive review of systems form is reviewed with the patient and is attached in the EMR.     PMH:  has a past medical history of Allergy; Asthma (3/27/2015); Back pain; Bronchitis; Chickenpox; Depression; Nasal drainage; and Seasonal allergies (3/27/2015). She also has no past medical history of ASTHMA.  MEDS:   Current Outpatient Prescriptions:   •  Multiple Vitamin (MULTI-VITAMIN PO), Take  by mouth., Disp: , Rfl:   •  Cholecalciferol (VITAMIN D PO), Take  by mouth., Disp: , Rfl:   •  albuterol (VENTOLIN HFA) 108 (90 Base) MCG/ACT Aero Soln inhalation aerosol, Inhale 2 Puffs by mouth every 6 hours as needed., Disp: 8.5 g, Rfl: 3  •  venlafaxine (EFFEXOR-XR) 150 MG extended-release capsule, Take 1 Cap by mouth every day. (Patient taking differently: Take 75 mg by mouth every day.), Disp: 30 Cap, Rfl: 5  •  mirtazapine (REMERON) 30 MG TABS tablet, Take 2 Tabs by mouth every bedtime. (Patient taking differently: Take 15 mg by mouth every bedtime.), Disp: 60 Tab, Rfl: 5  •  fluticasone (FLONASE) 50 MCG/ACT nasal spray, Spray 1 Spray in nose every day., Disp: 16 g, Rfl: 5  •  benzonatate (TESSALON) 100 MG CAPS, Take 1 Cap by mouth 3 times a day as needed for Cough., Disp: 15 Cap, Rfl: 0  ALLERGIES:   Allergies   Allergen Reactions   • Latex Unspecified      "rash     SURGHX: No past surgical history on file.  SOCHX:  reports that she quit smoking about 4 years ago. She has a 5.00 pack-year smoking history. She has never used smokeless tobacco. She reports that she drinks alcohol. She reports that she does not use drugs..  FH:   Family History   Problem Relation Age of Onset   • Other Mother      sleep walking   • Heart Attack Father    • Heart Failure Paternal Grandmother    • Heart Attack Paternal Grandfather          Physical Exam:  Vitals/ General Appearance:   Weight/BMI: Body mass index is 21.8 kg/m².  Resp. rate 15, height 1.651 m (5' 5\"), weight 59.4 kg (131 lb).  Vitals:    05/25/18 1119   Resp: 15   Weight: 59.4 kg (131 lb)   Height: 1.651 m (5' 5\")       Pt. is alert and oriented to time, place and person. Cooperative and in no apparent distress.       1. Head: Atraumatic, normocephalic.   2. Ears: Normal tympanic membrane and no discharge  3. Nose: No inferior turbinate hypertophy, no septal deviation, no polyp.   4. Throat: Oropharynx appears adequate in that the palate is  overhanging   5. Neck: Supple. No thyromegaly  6. Chest: Trachea central, no spine deformity   7. Lungs auscultation: B/L good air entry, vesicular breath sounds, no adventitious sounds  8. Heart auscultation: 1st and 2nd heart sounds normal, regular rhythm. No appreciable murmur.  9. Abdomen: Soft, non tender, no organomegaly. Bowel sounds present  10. Extremities: , no pedal edema.  11. Skin: No rash  12. NEUROLOGICAL EXAMINATION: On neurological exam, the patient was alert and oriented x3. speech was clear and fluent without dysarthria.      INVESTIGATIONS:           ASSESSMENT AND PLAN     1.Idiopathic Hypersomnolence: Patient has symptoms of hypersomnolence with symptoms of REM intrusions including sleep paralysis and ?cataplexy. MSLT was negative for SOREMPs. However the MSLT was done on Effexor which is a REM suppressing medication as well as used as a treatment of cataplexy. Due " to MSLT done of remeron and SSRI it is hard to r/o narcolepsy. She is hesistant of trying SSRI wash out period due to relapse of her depression and anxiety. HL  Meanwhile, she is is cautioned against drowsy driving and operating heavy machinery. If she feels sleepy while driving, she must pull over for a break/nap, rather than persist on the road, in the interest of her own safety and that of others on the road        Plan    - Starting on provigil 100 mg QD along with increasing Effexor to 187.5 mg daily        -  HLA DQB1 *06:02 was negative   - Therapeutic nap schedule of taking 20-30 min naps every 3 hours is recommended

## 2018-05-25 NOTE — PATIENT INSTRUCTIONS
Modafinil tablets  What is this medicine?  MODAFINIL (sudha DAF i nil) is used to treat excessive sleepiness caused by certain sleep disorders. This includes narcolepsy, sleep apnea, and shift work sleep disorder.  This medicine may be used for other purposes; ask your health care provider or pharmacist if you have questions.  COMMON BRAND NAME(S): Provigil  What should I tell my health care provider before I take this medicine?  They need to know if you have any of these conditions:  -history of depression, shawn, or other mental disorder  -kidney disease  -liver disease  -an unusual or allergic reaction to modafinil, other medicines, foods, dyes, or preservatives  -pregnant or trying to get pregnant  -breast-feeding  How should I use this medicine?  Take this medicine by mouth with a glass of water. Follow the directions on the prescription label. Take your doses at regular intervals. Do not take your medicine more often than directed. Do not stop taking except on your doctor's advice.  A special MedGuide will be given to you by the pharmacist with each prescription and refill. Be sure to read this information carefully each time.  Talk to your pediatrician regarding the use of this medicine in children. This medicine is not approved for use in children.  Overdosage: If you think you have taken too much of this medicine contact a poison control center or emergency room at once.  NOTE: This medicine is only for you. Do not share this medicine with others.  What if I miss a dose?  If you miss a dose, take it as soon as you can. If it is almost time for your next dose, take only that dose. Do not take double or extra doses.  What may interact with this medicine?  Do not take this medicine with any of the following medications:  -amphetamine or dextroamphetamine  -dexmethylphenidate or methylphenidate  -medicines called MAO Inhibitors like Nardil, Parnate, Marplan, Eldepryl  -pemoline  -procarbazine  This medicine may  also interact with the following medications:  -antifungal medicines like itraconazole or ketoconazole  -barbiturates like phenobarbital  -birth control pills or other hormone-containing birth control devices or implants  -carbamazepine  -cyclosporine  -diazepam  -medicines for depression, anxiety, or psychotic disturbances  -phenytoin  -propranolol  -triazolam  -warfarin  This list may not describe all possible interactions. Give your health care provider a list of all the medicines, herbs, non-prescription drugs, or dietary supplements you use. Also tell them if you smoke, drink alcohol, or use illegal drugs. Some items may interact with your medicine.  What should I watch for while using this medicine?  Visit your doctor or health care professional for regular checks on your progress. The full effects of this medicine may not be seen right away.  This medicine may affect your concentration, function, or may hide signs that you are tired. You may get dizzy. Do not drive, use machinery, or do anything that needs mental alertness until you know how this drug affects you. Alcohol can make you more dizzy and may interfere with your response to this medicine or your alertness. Avoid alcoholic drinks.  Birth control pills may not work properly while you are taking this medicine. Talk to your doctor about using an extra method of birth control.  It is unknown if the effects of this medicine will be increased by the use of caffeine. Caffeine is available in many foods, beverages, and medications. Ask your doctor if you should limit or change your intake of caffeine-containing products while on this medicine.  What side effects may I notice from receiving this medicine?  Side effects that you should report to your doctor or health care professional as soon as possible:  -allergic reactions like skin rash, itching or hives, swelling of the face, lips, or tongue  -anxiety  -breathing problems  -chest pain  -fast, irregular  heartbeat  -hallucinations  -increased blood pressure  -redness, blistering, peeling or loosening of the skin, including inside the mouth  -sore throat, fever, or chills  -suicidal thoughts or other mood changes  -tremors  -vomiting  Side effects that usually do not require medical attention (report to your doctor or health care professional if they continue or are bothersome):  -headache  -nausea, diarrhea, or stomach upset  -nervousness  -trouble sleeping  This list may not describe all possible side effects. Call your doctor for medical advice about side effects. You may report side effects to FDA at 0-971-FDA-6156.  Where should I keep my medicine?  Keep out of the reach of children. This medicine can be abused. Keep your medicine in a safe place to protect it from theft. Do not share this medicine with anyone. Selling or giving away this medicine is dangerous and against the law.  This medicine may cause accidental overdose and death if taken by other adults, children, or pets. Mix any unused medicine with a substance like cat litter or coffee grounds. Then throw the medicine away in a sealed container like a sealed bag or a coffee can with a lid. Do not use the medicine after the expiration date.  Store at room temperature between 20 and 25 degrees C (68 and 77 degrees F).  NOTE: This sheet is a summary. It may not cover all possible information. If you have questions about this medicine, talk to your doctor, pharmacist, or health care provider.  © 2018 [employer]/Gold Standard (2015-09-08 15:34:55)  Venlafaxine extended-release capsules  What is this medicine?  VENLAFAXINE(GARY la fax een) is used to treat depression, anxiety and panic disorder.  This medicine may be used for other purposes; ask your health care provider or pharmacist if you have questions.  COMMON BRAND NAME(S): Effexor XR  What should I tell my health care provider before I take this medicine?  They need to know if you have any of these  conditions:  -bleeding disorders  -glaucoma  -heart disease  -high blood pressure  -high cholesterol  -kidney disease  -liver disease  -low levels of sodium in the blood  -shawn or bipolar disorder  -seizures  -suicidal thoughts, plans, or attempt; a previous suicide attempt by you or a family  -take medicines that treat or prevent blood clots  -thyroid disease  -an unusual or allergic reaction to venlafaxine, desvenlafaxine, other medicines, foods, dyes, or preservatives  -pregnant or trying to get pregnant  -breast-feeding  How should I use this medicine?  Take this medicine by mouth with a full glass of water. Follow the directions on the prescription label. Do not cut, crush, or chew this medicine. Take it with food. If needed, the capsule may be carefully opened and the entire contents sprinkled on a spoonful of cool applesauce. Swallow the applesauce/pellet mixture right away without chewing and follow with a glass of water to ensure complete swallowing of the pellets. Try to take your medicine at about the same time each day. Do not take your medicine more often than directed. Do not stop taking this medicine suddenly except upon the advice of your doctor. Stopping this medicine too quickly may cause serious side effects or your condition may worsen.  A special MedGuide will be given to you by the pharmacist with each prescription and refill. Be sure to read this information carefully each time.  Talk to your pediatrician regarding the use of this medicine in children. Special care may be needed.  Overdosage: If you think you have taken too much of this medicine contact a poison control center or emergency room at once.  NOTE: This medicine is only for you. Do not share this medicine with others.  What if I miss a dose?  If you miss a dose, take it as soon as you can. If it is almost time for your next dose, take only that dose. Do not take double or extra doses.  What may interact with this medicine?  Do not  take this medicine with any of the following medications:  -certain medicines for fungal infections like fluconazole, itraconazole, ketoconazole, posaconazole, voriconazole  -cisapride  -desvenlafaxine  -dofetilide  -dronedarone  -duloxetine  -levomilnacipran  -linezolid  -MAOIs like Carbex, Eldepryl, Marplan, Nardil, and Parnate  -methylene blue (injected into a vein)  -milnacipran  -pimozide  -thioridazine  -ziprasidone  This medicine may also interact with the following medications:  -amphetamines  -aspirin and aspirin-like medicines  -certain medicines for depression, anxiety, or psychotic disturbances  -certain medicines for migraine headaches like almotriptan, eletriptan, frovatriptan, naratriptan, rizatriptan, sumatriptan, zolmitriptan  -certain medicines for sleep  -certain medicines that treat or prevent blood clots like dalteparin, enoxaparin, warfarin  -cimetidine  -clozapine  -diuretics  -fentanyl  -furazolidone  -indinavir  -isoniazid  -lithium  -metoprolol  -NSAIDS, medicines for pain and inflammation, like ibuprofen or naproxen  -other medicines that prolong the QT interval (cause an abnormal heart rhythm)  -procarbazine  -rasagiline  -supplements like Geraldine's wort, kava kava, valerian  -tramadol  -tryptophan  This list may not describe all possible interactions. Give your health care provider a list of all the medicines, herbs, non-prescription drugs, or dietary supplements you use. Also tell them if you smoke, drink alcohol, or use illegal drugs. Some items may interact with your medicine.  What should I watch for while using this medicine?  Tell your doctor if your symptoms do not get better or if they get worse. Visit your doctor or health care professional for regular checks on your progress. Because it may take several weeks to see the full effects of this medicine, it is important to continue your treatment as prescribed by your doctor.  Patients and their families should watch out for new  or worsening thoughts of suicide or depression. Also watch out for sudden changes in feelings such as feeling anxious, agitated, panicky, irritable, hostile, aggressive, impulsive, severely restless, overly excited and hyperactive, or not being able to sleep. If this happens, especially at the beginning of treatment or after a change in dose, call your health care professional.  This medicine can cause an increase in blood pressure. Check with your doctor for instructions on monitoring your blood pressure while taking this medicine.  You may get drowsy or dizzy. Do not drive, use machinery, or do anything that needs mental alertness until you know how this medicine affects you. Do not stand or sit up quickly, especially if you are an older patient. This reduces the risk of dizzy or fainting spells. Alcohol may interfere with the effect of this medicine. Avoid alcoholic drinks.  Your mouth may get dry. Chewing sugarless gum, sucking hard candy and drinking plenty of water will help. Contact your doctor if the problem does not go away or is severe.  What side effects may I notice from receiving this medicine?  Side effects that you should report to your doctor or health care professional as soon as possible:  -allergic reactions like skin rash, itching or hives, swelling of the face, lips, or tongue  -anxious  -breathing problems  -confusion  -changes in vision  -chest pain  -confusion  -elevated mood, decreased need for sleep, racing thoughts, impulsive behavior  -eye pain  -fast, irregular heartbeat  -feeling faint or lightheaded, falls  -feeling agitated, angry, or irritable  -hallucination, loss of contact with reality  -high blood pressure  -loss of balance or coordination  -palpitations  -redness, blistering, peeling or loosening of the skin, including inside the mouth  -restlessness, pacing, inability to keep still  -seizures  -stiff muscles  -suicidal thoughts or other mood changes  -trouble passing urine or  change in the amount of urine  -trouble sleeping  -unusual bleeding or bruising  -unusually weak or tired  -vomiting  Side effects that usually do not require medical attention (report to your doctor or health care professional if they continue or are bothersome):  -change in sex drive or performance  -change in appetite or weight  -constipation  -dizziness  -dry mouth  -headache  -increased sweating  -nausea  -tired  This list may not describe all possible side effects. Call your doctor for medical advice about side effects. You may report side effects to FDA at 4-641-FDA-8386.  Where should I keep my medicine?  Keep out of the reach of children.  Store at a controlled temperature between 20 and 25 degrees C (68 degrees and 77 degrees F), in a dry place. Throw away any unused medicine after the expiration date.  NOTE: This sheet is a summary. It may not cover all possible information. If you have questions about this medicine, talk to your doctor, pharmacist, or health care provider.  © 2018 [employer]vier/Gold Standard (2017-05-18 18:38:02)

## 2018-07-02 ENCOUNTER — SLEEP CENTER VISIT (OUTPATIENT)
Dept: SLEEP MEDICINE | Facility: MEDICAL CENTER | Age: 38
End: 2018-07-02
Payer: COMMERCIAL

## 2018-07-02 VITALS
OXYGEN SATURATION: 97 % | HEART RATE: 66 BPM | BODY MASS INDEX: 21.16 KG/M2 | WEIGHT: 127 LBS | DIASTOLIC BLOOD PRESSURE: 72 MMHG | SYSTOLIC BLOOD PRESSURE: 110 MMHG | RESPIRATION RATE: 15 BRPM | HEIGHT: 65 IN

## 2018-07-02 DIAGNOSIS — G47.11 IDIOPATHIC HYPERSOMNOLENCE: ICD-10-CM

## 2018-07-02 PROCEDURE — 99213 OFFICE O/P EST LOW 20 MIN: CPT | Performed by: FAMILY MEDICINE

## 2018-07-02 RX ORDER — MODAFINIL 200 MG/1
200 TABLET ORAL DAILY
Qty: 30 TAB | Refills: 1 | Status: SHIPPED | OUTPATIENT
Start: 2018-07-02 | End: 2018-08-01

## 2018-07-02 ASSESSMENT — ENCOUNTER SYMPTOMS
CHILLS: 0
SPUTUM: 0
INSOMNIA: 1
SLEEP DISTURBANCES DUE TO BREATHING: 0
HEARTBURN: 0
SNORES LOUDLY: 0
NAUSEA: 0
WEAKNESS: 0
COUGH: 0
COUGH DISTURBING SLEEP: 0
CHEST TIGHTNESS: 0
FEVER: 0
PALPITATIONS: 0
SHORTNESS OF BREATH: 0
SOMNOLENCE: 1
HEADACHES: 0
SWEATS: 0
DOUBLE VISION: 0
EYE DISCHARGE: 0

## 2018-07-02 NOTE — PROGRESS NOTES
Subjective:      Allison Kulkarni is a 38 y.o. female who presents with Follow-Up (1 Month FV)               [facility] Follow Up Note     Date: 7/2/2018 / Time: 10:04 AM    Patient ID:   Name:             Allison Kulkarni   YOB: 1980  Age:                 38 y.o.  female   MRN:               [id number]      Thank you for requesting a sleep medicine consultation on Allison Kulkarni at the sleep center. She presents today with the chief complaints of hypersomnia follow up.     HISTORY OF PRESENT ILLNESS:       Pt is currently on  provigil 100 mg QD along with  Effexor to 187.5 mg daily. She goes to sleep around 9 pm and wakes up around 6 am. She is getting about 5 hrs of sleep on a good night and about few hr of sleep on a bad night. The bad nights are about few per week. She is not taking therapeutic naps     On last visit lab was ordered and she was Negative for HLA-DQB1*06:02. Since the venlafaxine was increased on last visit her symptoms of sleep paralysis and hypnagogic hallucinations has improved and are rare. However the EDS continues. 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     SLEEP HISTORY   PSG and MSLT. During the overnight study, the patient demonstrated normal apnea hypopnea index of 0.3/hr. There was a total of 0 apneas and 2 hypopneas. In the supine position the respiratory disturbance index was 0 an hour and in the non-supine position the respiratory disturbance index was 1.1 per hour. The respiratory events were associated with O2 nakul of 93% and average O2 saturation of 96%. She spent 0 % of sleep time below 89% O2 saturation.     MSLT showed sleep latency of 5:15 min however no SPREMPs was seen. The REM latency on PSG was normal as well. She has anxiety disorder and currently on  effexor and remeron. She did not have wash out period before her sleep studies.      She has long standing symptoms of daytime sleepiness along with ? Cataplexy like symptoms when she feels like her legs give out when laughing uncontrollably or if she has a panic attack. It happens about once every couple months because she is very conscious about her emotions and helps not to have those. She also has symptoms of sleep paralysis which is a few times a week. Other symptoms include hypnagogic hallucinations which are 1-2 per month. Denies FMH of narcolepsy.        PMH:  has a past medical history of Allergy; Asthma (3/27/2015); Back pain; Bronchitis; Chickenpox; Depression; Nasal drainage; and Seasonal allergies (3/27/2015). She also has no past medical history of ASTHMA.  MEDS: Current Outpatient Prescriptions: •  venlafaxine (EFFEXOR XR) 150 MG extended-release capsule, Take 1 Cap by mouth every day., Disp: 30 Cap, Rfl: 3•  venlafaxine XR (EFFEXOR XR) 37.5 MG CAPSULE SR 24 HR, Take 1 Cap by mouth every day., Disp: 30 Cap, Rfl: 3•  Multiple Vitamin (MULTI-VITAMIN PO), Take  by mouth., Disp: , Rfl: •  Cholecalciferol (VITAMIN D PO), Take  by mouth., Disp: , Rfl: •  albuterol (VENTOLIN HFA) 108 (90 Base) MCG/ACT Aero Soln inhalation aerosol, Inhale 2 Puffs by mouth every 6 hours as needed., Disp: 8.5 g, Rfl: 3•  venlafaxine (EFFEXOR-XR) 150 MG extended-release capsule, Take 1 Cap by mouth every day. (Patient taking differently: Take 75 mg by mouth every day.), Disp: 30 Cap, Rfl: 5•  mirtazapine (REMERON) 30 MG TABS tablet, Take 2 Tabs by mouth every bedtime. (Patient taking differently: Take 15 mg by mouth every bedtime.), Disp: 60 Tab, Rfl: 5•  fluticasone (FLONASE) 50 MCG/ACT nasal spray, Spray 1 Spray in nose every day., Disp: 16 g, Rfl: 5•  benzonatate (TESSALON) 100 MG CAPS, Take 1 Cap by mouth 3 times a day as needed for Cough., Disp: 15 Cap, Rfl: 0  ALLERGIES:  -- Latex -- Unspecified    --  rash  SURGHX:  "History reviewed. No pertinent surgical history.  SOCHX:  reports that she quit smoking about 4 years ago. She has a 5.00 pack-year smoking history. She has never used smokeless tobacco. She reports that she drinks alcohol. She reports that she does not use drugs..  FH: Review of patient's family history indicates:    Other                          Mother                      Comment: sleep walking    Heart Attack                   Father                    Heart Failure                  Paternal Grandmother      Heart Attack                   Paternal Grandfather                          Review of Systems   Constitutional: Negative for fever, chills and sweats.   Eyes: Negative for eye discharge and double vision.   HENT: Negative for ear pain, hearing loss and tinnitus.    Cardiovascular: Negative for chest pain, chest tightness and palpitations.   Respiratory: Negative for shortness of breath, cough and sputum.    Sleep: Positive for somnolence and insomnia. Negative for snores loudly, sleep disturbances due to breathing and cough disturbing sleep.   Gastrointestinal: Negative for heartburn, dysphagia and nausea.   Skin: Negative for rash.   Neurological: Negative for headaches and weakness.          Objective:     Vitals:    07/02/18 0954   Height: 1.651 m (5' 5\")   Weight: 57.6 kg (127 lb)   Weight % change since last entry.: 0 %   BP: 110/72   Pulse: 66   BMI (Calculated): 21.13   Resp: 15   O2 sat % room air: 97 %       Physical Exam   Constitutional: She is oriented to person, place, and time. She appears well-developed and well-nourished.   HENT:   Head: Normocephalic and atraumatic.   Eyes: EOM are normal. Right eye exhibits no discharge. Left eye exhibits no discharge.   Neck: Normal range of motion. Neck supple.   Cardiovascular: Normal rate, regular rhythm and normal heart sounds.    Pulmonary/Chest: Effort normal and breath sounds normal. No respiratory distress.   Neurological: She is alert and " oriented to person, place, and time.   Skin: Skin is warm and dry.   Psychiatric:   Flat affect               Assessment/Plan:     1.Idiopathic Hypersomnolence: Patient has symptoms of hypersomnolence with symptoms of REM intrusions including sleep paralysis and ?cataplexy. MSLT was negative for SOREMPs. However the MSLT was done on Effexor which is a REM suppressing medication as well as used as a treatment of cataplexy. Due to MSLT done of remeron and SSRI it is hard to r/o narcolepsy. She is hesistant of trying SSRI wash out period due to relapse of her depression and anxiety. HL  Meanwhile, she is is cautioned against drowsy driving and operating heavy machinery. If she feels sleepy while driving, she must pull over for a break/nap, rather than persist on the road, in the interest of her own safety and that of others on the road           Plan     · Increased provigil to 200 mg QD along with Effexor to 187.5 mg daily. Denies side effects of the medications        -     HLA DQB1 *06:02 was negative   · Readdressed Therapeutic nap schedule of taking 20-30 min naps every 3 hours is recommended    Provigil is prescribed. I have obtained and reviewed patient utilization report from [facility] prior to writing prescription for controlled substance II, III or IV. Based on the report and my clinical assessment the prescription is medically necessary. Pt was advised to use provigi appropriately. Side affects were discussed in detail.         Narcotics: 0 (Value from Goalbook System.)    Sedatives: 0 (Value from Goalbook System.)    Stimulants: 121 (Value from Goalbook System.)     NARxSCORES can range from 000 to 999. This first two digits represent the composite percentile risk based on an overall analysis of prescription drug use. The third digit represents the number of active prescriptions. The distribution of scores in the population is such that approximately 75% fall below 200, 95% fall below 500 and 99% fall  below 650.     SELECT THE LINK BELOW TO REVIEW THE NARxCheck REPORT  or  SELECT THE 'ACCEPT' BUTTON TO CONTINUTE AFTER REVIEWING THE SCORES

## 2018-08-02 ENCOUNTER — SLEEP CENTER VISIT (OUTPATIENT)
Dept: SLEEP MEDICINE | Facility: MEDICAL CENTER | Age: 38
End: 2018-08-02
Payer: COMMERCIAL

## 2018-08-02 VITALS
BODY MASS INDEX: 21.16 KG/M2 | HEIGHT: 65 IN | WEIGHT: 127 LBS | SYSTOLIC BLOOD PRESSURE: 110 MMHG | OXYGEN SATURATION: 97 % | HEART RATE: 80 BPM | RESPIRATION RATE: 15 BRPM | DIASTOLIC BLOOD PRESSURE: 72 MMHG

## 2018-08-02 DIAGNOSIS — G47.8 SLEEP PARALYSIS: ICD-10-CM

## 2018-08-02 DIAGNOSIS — G47.11 IDIOPATHIC HYPERSOMNOLENCE: ICD-10-CM

## 2018-08-02 PROCEDURE — 99213 OFFICE O/P EST LOW 20 MIN: CPT | Performed by: FAMILY MEDICINE

## 2018-08-02 RX ORDER — ARMODAFINIL 150 MG/1
150 TABLET ORAL
Qty: 30 TAB | Refills: 2 | Status: SHIPPED | OUTPATIENT
Start: 2018-08-02 | End: 2018-09-01

## 2018-08-02 NOTE — PROGRESS NOTES
Subjective:      Allison Kulkarni is a 38 y.o. female who presents with Follow-Up (FV )               [facility] Follow Up Note     Date: 8/2/2018 / Time: 3:17 PM    Patient ID:   Name:             Allison Kulkarni   YOB: 1980  Age:                 38 y.o.  female   MRN:               [id number]      Thank you for requesting a sleep medicine consultation on Allison Kulkarni at the sleep center. She presents today with the chief complaints of idiopathic Hypersomnia follow up.     HISTORY OF PRESENT ILLNESS:       Pt is currently on provigil 100 mg and effexor 150 mg . She goes to sleep around 9:30 pm and wakes up around 5:30 am. She is getting about 8 hrs of sleep on a good night and about 6-7 hr of sleep on a bad night. The bad nights are about few per week.      The provigil was increased on the last visit however she did not feel much difference, she continues to have excessive day time sleepiness. However with increase in dosage she has trouble falling asleep and palpations occasionally. She denies the ? ctaplexy symptoms recently which included weakness in legs.However she continues to have few episodes sleep paralysis per week along with hypnagogic hallucination.     During the overnight study, the patient demonstrated normal apnea hypopnea index of 0.3/hr. There was a total of 0 apneas and 2 hypopneas. In the supine position the respiratory disturbance index was 0 an hour and in the non-supine position the respiratory disturbance index was 1.1 per hour. The respiratory events were associated with O2 nakul of 93% and average O2 saturation of 96%. She spent 0 % of sleep time below 89% O2 saturation. MSLT showed sleep latency of 5:15 min however no SPREMPs was seen. The REM latency on PSG was normal as well. She has anxiety disorder and currently on effexor and remeron. She did not have wash out period before her sleep studies.            PMH:  has a past medical  history of Allergy; Asthma (3/27/2015); Back pain; Bronchitis; Chickenpox; Depression; Nasal drainage; and Seasonal allergies (3/27/2015). She also has no past medical history of ASTHMA.  MEDS: Current Outpatient Prescriptions: •  venlafaxine (EFFEXOR XR) 150 MG extended-release capsule, Take 1 Cap by mouth every day., Disp: 30 Cap, Rfl: 3•  venlafaxine XR (EFFEXOR XR) 37.5 MG CAPSULE SR 24 HR, Take 1 Cap by mouth every day., Disp: 30 Cap, Rfl: 3•  Multiple Vitamin (MULTI-VITAMIN PO), Take  by mouth., Disp: , Rfl: •  Cholecalciferol (VITAMIN D PO), Take  by mouth., Disp: , Rfl: •  albuterol (VENTOLIN HFA) 108 (90 Base) MCG/ACT Aero Soln inhalation aerosol, Inhale 2 Puffs by mouth every 6 hours as needed., Disp: 8.5 g, Rfl: 3•  venlafaxine (EFFEXOR-XR) 150 MG extended-release capsule, Take 1 Cap by mouth every day. (Patient taking differently: Take 75 mg by mouth every day.), Disp: 30 Cap, Rfl: 5•  mirtazapine (REMERON) 30 MG TABS tablet, Take 2 Tabs by mouth every bedtime. (Patient taking differently: Take 15 mg by mouth every bedtime.), Disp: 60 Tab, Rfl: 5•  fluticasone (FLONASE) 50 MCG/ACT nasal spray, Spray 1 Spray in nose every day., Disp: 16 g, Rfl: 5•  benzonatate (TESSALON) 100 MG CAPS, Take 1 Cap by mouth 3 times a day as needed for Cough., Disp: 15 Cap, Rfl: 0  ALLERGIES:  -- Latex -- Unspecified    --  rash  SURGHX: History reviewed. No pertinent surgical history.  SOCHX:  reports that she quit smoking about 4 years ago. She has a 5.00 pack-year smoking history. She has never used smokeless tobacco. She reports that she drinks alcohol. She reports that she does not use drugs..  FH: Review of patient's family history indicates:  Problem: Other      Relation: Mother       Age of Onset: (Not Specified)       Comment: sleep walking  Problem: Heart Attack      Relation: Father       Age of Onset: (Not Specified)   Problem: Heart Failure      Relation: Paternal Grandmother       Age of Onset: (Not Specified)  "  Problem: Heart Attack      Relation: Paternal Grandfather       Age of Onset: (Not Specified)                                     Review of Systems      Constitutional: Denies fevers, Denies weight changes  Eyes: Denies changes in vision, no eye pain  Ears/Nose/Throat/Mouth: Denies nasal congestion or sore throat   Cardiovascular: Denies chest pain or palpitations   Respiratory: Denies shortness of breath , Denies cough  Gastrointestinal/Hepatic: Denies abdominal pain, nausea, vomiting, diarrhea, constipation or GI bleeding   Genitourinary: Denies bladder dysfunction, dysuria or frequency  Neurological: Denies headache, confusion, memory loss or focal weakness/parasthesias  Psychiatric: denies mood disorder        Objective:     Vitals:    08/02/18 1449   Height: 1.651 m (5' 5\")   Weight: 57.6 kg (127 lb)   Weight % change since last entry.: 0 %   BP: 110/72   Pulse: 80   BMI (Calculated): 21.13   Resp: 15   O2 sat % room air: 97 %       Physical Exam   Constitutional: She is oriented to person, place, and time. She appears well-developed and well-nourished.   HENT:   Head: Normocephalic and atraumatic.   Mouth/Throat: Oropharynx is clear and moist.   Eyes: Pupils are equal, round, and reactive to light. Right eye exhibits no discharge. Left eye exhibits no discharge.   Cardiovascular: Normal rate and regular rhythm.    No murmur heard.  Pulmonary/Chest: Effort normal and breath sounds normal. No respiratory distress. She has no wheezes.   Neurological: She is alert and oriented to person, place, and time.   Skin: Skin is warm and dry.   Psychiatric: She has a normal mood and affect.              Assessment/Plan:       ASSESSMENT AND PLAN   1.Idiopathic Hypersomnolence: Patient has symptoms of hypersomnolence with symptoms of REM intrusions including sleep paralysis and ?cataplexy. MSLT was negative for SOREMPs. However the MSLT was done on Effexor which is a REM suppressing medication as well as used as a " treatment of cataplexy. Due to MSLT done of remeron and SSRI it is hard to r/o narcolepsy. She is hesistant of trying SSRI wash out period due to relapse of her depression and anxiety. HL  Meanwhile, she is is cautioned against drowsy driving and operating heavy machinery. If she feels sleepy while driving, she must pull over for a break/nap, rather than persist on the road, in the interest of her own safety and that of others on the road           Plan        - even tough the MSLT is negative for narcolepsy, I have high suspicion of narcolepsy w/ cataplexy based  on her symptoms          - Switching to nuvigil 150 mg daily. Recommended to D/c provigil        - Xyrem was also discussed and a possible medication for EDS          -     HLA DQB1 *06:02 was negative   · Readdressed Therapeutic nap schedule of taking 20-30 min naps every 3 hours is recommended     nuvigil is prescribed. I have obtained and reviewed patient utilization report from [facility] prior to writing prescription for controlled substance II, III or IV. Based on the report and my clinical assessment the prescription is medically necessary. Pt was advised to use it appropriately. Side affects were discussed in detail     Narcotics: 0 (Value from [facility].)    Sedatives: 0 (Value from [facility].)    Stimulants: 161 (Value from [facility].)  No Value Found: No Data Found. (Value from [facility].)  NARxSCORES can range from 000 to 999. This first two digits represent the composite percentile risk based on an overall analysis of prescription drug use. The third digit represents the number of active prescriptions. The distribution of scores in the population is such that approximately 75% fall below 200, 95% fall below 500 and 99% fall below 650.     SELECT THE LINK BELOW TO REVIEW THE [facility] REPORT  or  SELECT THE 'ACCEPT' BUTTON TO CONTINUTE AFTER REVIEWING THE SCORES

## 2018-09-13 ENCOUNTER — SLEEP CENTER VISIT (OUTPATIENT)
Dept: SLEEP MEDICINE | Facility: MEDICAL CENTER | Age: 38
End: 2018-09-13
Payer: COMMERCIAL

## 2018-09-13 VITALS
SYSTOLIC BLOOD PRESSURE: 128 MMHG | BODY MASS INDEX: 22.33 KG/M2 | HEIGHT: 65 IN | DIASTOLIC BLOOD PRESSURE: 78 MMHG | HEART RATE: 75 BPM | RESPIRATION RATE: 15 BRPM | OXYGEN SATURATION: 97 % | WEIGHT: 134 LBS

## 2018-09-13 DIAGNOSIS — G47.8 SLEEP PARALYSIS: ICD-10-CM

## 2018-09-13 DIAGNOSIS — G47.11 IDIOPATHIC HYPERSOMNOLENCE: ICD-10-CM

## 2018-09-13 DIAGNOSIS — F32.9 REACTIVE DEPRESSION: ICD-10-CM

## 2018-09-13 PROCEDURE — 99214 OFFICE O/P EST MOD 30 MIN: CPT | Performed by: FAMILY MEDICINE

## 2018-09-13 RX ORDER — METHYLPHENIDATE HYDROCHLORIDE 10 MG/1
10 TABLET ORAL DAILY
Qty: 30 EACH | Refills: 0 | Status: SHIPPED | OUTPATIENT
Start: 2018-09-13 | End: 2018-10-10 | Stop reason: SDUPTHER

## 2018-09-13 RX ORDER — VENLAFAXINE HYDROCHLORIDE 75 MG/1
75 CAPSULE, EXTENDED RELEASE ORAL DAILY
Qty: 30 CAP | Refills: 1 | Status: SHIPPED | OUTPATIENT
Start: 2018-09-13

## 2018-09-13 NOTE — PROGRESS NOTES
Subjective:      Allison Kulkarni is a 38 y.o. female who presents with Follow-Up (6 Week FV )               [facility] Follow Up Note     Date: 9/13/2018 / Time: 3:30 PM    Patient ID:   Name:             Allison Kulkarni   YOB: 1980  Age:                 38 y.o.  female   MRN:               [id number]      Thank you for requesting a sleep medicine consultation on Allison Kulkarni at the sleep center. She presents today with the chief complaints of hypersomnolance and parasomnia follow up.     HISTORY OF PRESENT ILLNESS:           Pt is currently on  nuvigil 150 mg daily and effexor 150 mg . Provigil was d/flakita on last visit.  She goes to sleep around 9:30 pm and wakes up around 5:30 am. She is getting about 8 hrs of sleep on a good night and about 6-7 hr of sleep on a bad night. The bad nights are about few per week.       By changing to nuvigil on the last visit she did not feel much difference and amost days she felt more daytime hypersomnia than provigil. She continues to have excessive day time sleepiness that effects her day time fuction.  She denies the ? ctaplexy symptoms recently which included weakness in legs however has been feeling numbness on face when she get anxious. She continues to have few episodes sleep paralysis per week along with hypnagogic hallucination.     Over past few months on higher doeses of effexor she has been having symptoms of discomfort in legs which starts lat in the evening where she has the urge to move legs. It has been interfering with her sleep onset and as per her significant other she has has been restless as well. Denies hx of  Anemia, FMH of RLS. Menses are regular.      SLEEP HISTORY   the patient demonstrated normal apnea hypopnea index of 0.3/hr. There was a total of 0 apneas and 2 hypopneas. In the supine position the respiratory disturbance index was 0 an hour and in the non-supine position the respiratory  disturbance index was 1.1 per hour. The respiratory events were associated with O2 nakul of 93% and average O2 saturation of 96%. She spent 0 % of sleep time below 89% O2 saturation. MSLT showed sleep latency of 5:15 min however no SPREMPs was seen. The REM latency on PSG was normal as well. She has anxiety disorder and currently on effexor and remeron. She did not have wash out period before her sleep studies.          PMH:  has a past medical history of Allergy; Asthma (3/27/2015); Back pain; Bronchitis; Chickenpox; Depression; Nasal drainage; and Seasonal allergies (3/27/2015). She also has no past medical history of ASTHMA.  MEDS: Current Outpatient Prescriptions: •  venlafaxine (EFFEXOR XR) 150 MG extended-release capsule, Take 1 Cap by mouth every day., Disp: 30 Cap, Rfl: 3•  venlafaxine XR (EFFEXOR XR) 37.5 MG CAPSULE SR 24 HR, Take 1 Cap by mouth every day., Disp: 30 Cap, Rfl: 3•  Multiple Vitamin (MULTI-VITAMIN PO), Take  by mouth., Disp: , Rfl: •  Cholecalciferol (VITAMIN D PO), Take  by mouth., Disp: , Rfl: •  albuterol (VENTOLIN HFA) 108 (90 Base) MCG/ACT Aero Soln inhalation aerosol, Inhale 2 Puffs by mouth every 6 hours as needed., Disp: 8.5 g, Rfl: 3•  venlafaxine (EFFEXOR-XR) 150 MG extended-release capsule, Take 1 Cap by mouth every day. (Patient taking differently: Take 75 mg by mouth every day.), Disp: 30 Cap, Rfl: 5•  mirtazapine (REMERON) 30 MG TABS tablet, Take 2 Tabs by mouth every bedtime. (Patient taking differently: Take 15 mg by mouth every bedtime.), Disp: 60 Tab, Rfl: 5•  fluticasone (FLONASE) 50 MCG/ACT nasal spray, Spray 1 Spray in nose every day., Disp: 16 g, Rfl: 5•  benzonatate (TESSALON) 100 MG CAPS, Take 1 Cap by mouth 3 times a day as needed for Cough., Disp: 15 Cap, Rfl: 0  ALLERGIES:  -- Latex -- Unspecified    --  rash  SURGHX: No past surgical history on file.  SOCHX:  reports that she quit smoking about 4 years ago. She has a 5.00 pack-year smoking history. She has never  "used smokeless tobacco. She reports that she drinks alcohol. She reports that she does not use drugs..  FH: Review of patient's family history indicates:  Problem: Other      Relation: Mother       Age of Onset: (Not Specified)       Comment: sleep walking  Problem: Heart Attack      Relation: Father       Age of Onset: (Not Specified)   Problem: Heart Failure      Relation: Paternal Grandmother       Age of Onset: (Not Specified)   Problem: Heart Attack      Relation: Paternal Grandfather       Age of Onset: (Not Specified)                       Review of Systems  Constitutional: Denies fevers, Denies weight changes  Eyes: Denies changes in vision, no eye pain  Ears/Nose/Throat/Mouth: Denies nasal congestion or sore throat   Cardiovascular: Denies chest pain or palpitations   Respiratory: Denies shortness of breath , Denies cough  Gastrointestinal/Hepatic: Denies abdominal pain, nausea, vomiting, diarrhea, constipation or GI bleeding   Genitourinary: Denies bladder dysfunction, dysuria or frequency  Psychiatric: depression and anxiety  Sleep: EDS, sleep paralysis, hallucinations      Objective:     Vitals:    09/13/18 1504   Height: 1.651 m (5' 5\")   Weight: 60.8 kg (134 lb)   Weight % change since last entry.: 0 %   BP: 128/78   Pulse: 75   BMI (Calculated): 22.3   Resp: 15   O2 sat % room air: 97 %       Physical Exam         1. Head: Atraumatic, normocephalic.   2. Ears: Normal tympanic membrane and no discharge  3. Nose: No inferior turbinate hypertophy   4. Throat: Oropharynx appears adequate in that the palate is not overhanging    5. Neck: Supple. No thyromegaly  6. Chest: Trachea central, no spine deformity   7. Lungs auscultation: B/L good air entry, vesicular breath sounds, no adventitious sounds  8. Heart auscultation: 1st and 2nd heart sounds normal, regular rhythm. No appreciable murmur.  9. Abdomen: Soft, non tender, no organomegaly. Bowel sounds present  10. Extremities:  no pedal edema.  11. Skin: " No rash  Assessment/Plan:      1.Idiopathic Hypersomnolence: Patient has symptoms of hypersomnolence with symptoms of REM intrusions including sleep paralysis and ?cataplexy. MSLT was negative for SOREMPs. However the MSLT was done on Effexor which is a REM suppressing medication as well as used as a treatment of cataplexy. Due to MSLT done of remeron and SSRI it is hard to r/o narcolepsy.  Meanwhile, she is is cautioned against drowsy driving and operating heavy machinery. If she feels sleepy while driving, she must pull over for a break/nap, rather than persist on the road, in the interest of her own safety and that of others on the road           Plan        - even tough the MSLT is negative for narcolepsy, I have high suspicion of narcolepsy w/ cataplexy based            on her symptoms                     - Switching to ritalin 10mg daily. Recommended to D/c nuvigil        - RLS symptoms due to high doses of effexor, will start to decrease the dose and possible wean off of effexor to ether fluoxitine or to TCA like Protriptyline         - hold of on Xyrem since she doesn't meet the criteria for narcolepsy                 -     HLA DQB1 *06:02 was negative   · Readdressed Therapeutic nap schedule of taking 20-30 min naps every 3 hours is recommended     nuvigil is prescribed. [facility] to writing prescription for controlled substance II, III or IV. Based on the report and my clinical assessment the prescription is medically necessary. Pt was advised to use it appropriately. Side affects were discussed in detail

## 2018-09-13 NOTE — PATIENT INSTRUCTIONS
Methylphenidate tablets  What is this medicine?  METHYLPHENIDATE (meth il FEN i date) is used to treat attention-deficit hyperactivity disorder (ADHD). It is also used to treat narcolepsy.  This medicine may be used for other purposes; ask your health care provider or pharmacist if you have questions.  COMMON BRAND NAME(S): Methylin, Ritalin  What should I tell my health care provider before I take this medicine?  They need to know if you have any of these conditions:  -anxiety or panic attacks  -circulation problems in fingers and toes  -glaucoma  -hardening or blockages of the arteries or heart blood vessels  -heart disease or a heart defect  -high blood pressure  -history of a drug or alcohol abuse problem  -history of stroke  -liver disease  -mental illness  -motor tics, family history or diagnosis of Tourette's syndrome  -seizures  -suicidal thoughts, plans, or attempt; a previous suicide attempt by you or a family member  -thyroid disease  -an unusual or allergic reaction to methylphenidate, other medicines, foods, dyes, or preservatives  -pregnant or trying to get pregnant  -breast-feeding  How should I use this medicine?  Take this medicine by mouth with a glass of water. Follow the directions on the prescription label. It is best to take this medicine 30 to 45 minutes before meals, unless your doctor tells you otherwise. Take your medicine at regular intervals. Usually the last dose of the day will be taken at least 4 to 6 hours before bedtime, so it will not interfere with sleep. Do not take your medicine more often than directed.  A special MedGuide will be given to you by the pharmacist with each prescription and refill. Be sure to read this information carefully each time.  Talk to your pediatrician regarding the use of this medicine in children. While this drug may be prescribed for children as young as 6 years of age for selected conditions, precautions do apply.  Overdosage: If you think you have  taken too much of this medicine contact a poison control center or emergency room at once.  NOTE: This medicine is only for you. Do not share this medicine with others.  What if I miss a dose?  If you miss a dose, take it as soon as you can. If it is almost time for your next dose, take only that dose. Do not take double or extra doses.  What may interact with this medicine?  Do not take this medicine with any of the following medications:  -lithium  -MAOIs like Carbex, Eldepryl, Marplan, Nardil, and Parnate  -other stimulant medicines for attention disorders, weight loss, or to stay awake  -procarbazine  This medicine may also interact with the following medications:  -atomoxetine  -caffeine  -certain medicines for blood pressure, heart disease, irregular heart beat  -certain medicines for depression, anxiety, or psychotic disturbances  -certain medicines for seizures like carbamazepine, phenobarbital, phenytoin  -cold or allergy medicines  -warfarin  This list may not describe all possible interactions. Give your health care provider a list of all the medicines, herbs, non-prescription drugs, or dietary supplements you use. Also tell them if you smoke, drink alcohol, or use illegal drugs. Some items may interact with your medicine.  What should I watch for while using this medicine?  Visit your doctor or health care professional for regular checks on your progress. This prescription requires that you follow special procedures with your doctor and pharmacy. You will need to have a new written prescription from your doctor or health care professional every time you need a refill.  This medicine may affect your concentration, or hide signs of tiredness. Until you know how this drug affects you, do not drive, ride a bicycle, use machinery, or do anything that needs mental alertness.  Tell your doctor or health care professional if this medicine loses its effects, or if you feel you need to take more than the  prescribed amount. Do not change the dosage without talking to your doctor or health care professional.  For males, contact your doctor or health care professional right away if you have an erection that lasts longer than 4 hours or if it becomes painful. This may be a sign of a serious problem and must be treated right away to prevent permanent damage.  Decreased appetite is a common side effect when starting this medicine. Eating small, frequent meals or snacks can help. Talk to your doctor if you continue to have poor eating habits. Height and weight growth of a child taking this medicine will be monitored closely.  Do not take this medicine close to bedtime. It may prevent you from sleeping.  If you are going to need surgery, a MRI, CT scan, or other procedure, tell your doctor that you are taking this medicine. You may need to stop taking this medicine before the procedure.  Tell your doctor or healthcare professional right away if you notice unexplained wounds on your fingers and toes while taking this medicine. You should also tell your healthcare provider if you experience numbness or pain, changes in the skin color, or sensitivity to temperature in your fingers or toes.  What side effects may I notice from receiving this medicine?  Side effects that you should report to your doctor or health care professional as soon as possible:  -allergic reactions like skin rash, itching or hives, swelling of the face, lips, or tongue  -changes in vision  -chest pain or chest tightness  -fast, irregular heartbeat  -fingers or toes feel numb, cool, painful  -hallucination, loss of contact with reality  -high blood pressure  -males: prolonged or painful erection  -seizures  -severe headaches  -shortness of breath  -suicidal thoughts or other mood changes  -trouble walking, dizziness, loss of balance or coordination  -uncontrollable head, mouth, neck, arm, or leg movements  -unusual bleeding or bruising  Side effects that  usually do not require medical attention (report to your doctor or health care professional if they continue or are bothersome):  -anxious  -headache  -loss of appetite  -nausea, vomiting  -trouble sleeping  -weight loss  This list may not describe all possible side effects. Call your doctor for medical advice about side effects. You may report side effects to FDA at 9-134-FDA-1922.  Where should I keep my medicine?  Keep out of the reach of children. This medicine can be abused. Keep your medicine in a safe place to protect it from theft. Do not share this medicine with anyone. Selling or giving away this medicine is dangerous and against the law.  This medicine may cause accidental overdose and death if taken by other adults, children, or pets. Mix any unused medicine with a substance like cat litter or coffee grounds. Then throw the medicine away in a sealed container like a sealed bag or a coffee can with a lid. Do not use the medicine after the expiration date.  Store at room temperature between 15 and 30 degrees C (59 and 86 degrees F). Protect from light and moisture. Keep container tightly closed.  NOTE: This sheet is a summary. It may not cover all possible information. If you have questions about this medicine, talk to your doctor, pharmacist, or health care provider.  © 2018 [employer]/Gold Standard (2015-09-08 15:33:34)

## 2018-10-10 ENCOUNTER — TELEPHONE (OUTPATIENT)
Dept: SLEEP MEDICINE | Facility: MEDICAL CENTER | Age: 38
End: 2018-10-10

## 2018-10-10 DIAGNOSIS — G47.11 IDIOPATHIC HYPERSOMNOLENCE: Primary | ICD-10-CM

## 2018-10-10 RX ORDER — VENLAFAXINE HYDROCHLORIDE 150 MG/1
150 CAPSULE, EXTENDED RELEASE ORAL DAILY
Qty: 30 CAP | Refills: 3 | Status: CANCELLED | OUTPATIENT
Start: 2018-10-15 | End: 2018-11-14

## 2018-10-10 RX ORDER — METHYLPHENIDATE HYDROCHLORIDE 10 MG/1
10 TABLET ORAL DAILY
Qty: 30 EACH | Refills: 0 | Status: SHIPPED | OUTPATIENT
Start: 2018-10-15 | End: 2018-11-14

## 2018-10-10 NOTE — TELEPHONE ENCOUNTER
Narcotics: 0 (Value from [facility] [facility].)    Sedatives: 0 (Value from [facility] [facility].)    Stimulants: 191 (Value from [facility] [facility].)     NARxSCORES can range from 000 to 999. This first two digits represent the composite percentile risk based on an overall analysis of prescription drug use. The third digit represents the number of active prescriptions. The distribution of scores in the population is such that approximately 75% fall below 200, 95% fall below 500 and 99% fall below 650.     SELECT THE LINK BELOW TO REVIEW THE [facility] REPORT  or  SELECT THE 'ACCEPT' BUTTON TO CONTINUTE AFTER REVIEWING THE SCORES          Effexor was not refilled since she is weaning off of it due to EA and since it is not helping with the cataplexy.

## 2018-10-10 NOTE — TELEPHONE ENCOUNTER
LVM for pt informing her that Dr. Spencer did sign the Rx for the Ritalin and it's ready for pick at the [facility]. I also did inform her that Dr. Spencer did not fill her Rx for Effexor. Per Dr. Spencer Effexor was not refilled since she is weaning off of it due to EA and since it is not helping with the cataplexy.  I advise her to contact us if she has any questions or concerns.

## 2018-10-11 ENCOUNTER — APPOINTMENT (OUTPATIENT)
Dept: SLEEP MEDICINE | Facility: MEDICAL CENTER | Age: 38
End: 2018-10-11
Payer: COMMERCIAL

## 2018-10-22 ENCOUNTER — SLEEP CENTER VISIT (OUTPATIENT)
Dept: SLEEP MEDICINE | Facility: MEDICAL CENTER | Age: 38
End: 2018-10-22
Payer: COMMERCIAL

## 2018-10-22 VITALS
WEIGHT: 133 LBS | HEART RATE: 77 BPM | SYSTOLIC BLOOD PRESSURE: 130 MMHG | DIASTOLIC BLOOD PRESSURE: 82 MMHG | RESPIRATION RATE: 15 BRPM | HEIGHT: 65 IN | OXYGEN SATURATION: 97 % | BODY MASS INDEX: 22.16 KG/M2

## 2018-10-22 DIAGNOSIS — R51.9 CHRONIC NONINTRACTABLE HEADACHE, UNSPECIFIED HEADACHE TYPE: ICD-10-CM

## 2018-10-22 DIAGNOSIS — F32.A DEPRESSION, UNSPECIFIED DEPRESSION TYPE: ICD-10-CM

## 2018-10-22 DIAGNOSIS — G47.411 NARCOLEPSY CATAPLEXY SYNDROME: ICD-10-CM

## 2018-10-22 DIAGNOSIS — G89.29 CHRONIC NONINTRACTABLE HEADACHE, UNSPECIFIED HEADACHE TYPE: ICD-10-CM

## 2018-10-22 PROCEDURE — 99214 OFFICE O/P EST MOD 30 MIN: CPT | Performed by: FAMILY MEDICINE

## 2018-10-22 RX ORDER — VENLAFAXINE HYDROCHLORIDE 75 MG/1
75 CAPSULE, EXTENDED RELEASE ORAL DAILY
Qty: 30 CAP | Refills: 3 | Status: SHIPPED | OUTPATIENT
Start: 2018-10-22 | End: 2019-03-05 | Stop reason: SDUPTHER

## 2018-10-22 RX ORDER — METHYLPHENIDATE HYDROCHLORIDE 10 MG/1
10 TABLET ORAL 2 TIMES DAILY
Qty: 60 TAB | Refills: 0 | Status: SHIPPED | OUTPATIENT
Start: 2018-11-21 | End: 2018-12-21

## 2018-10-22 RX ORDER — SODIUM OXYBATE 0.5 G/ML
SOLUTION ORAL
Qty: 1 BOTTLE | Refills: 2 | Status: SHIPPED | OUTPATIENT
Start: 2018-10-22 | End: 2018-11-22

## 2018-10-22 RX ORDER — METHYLPHENIDATE HYDROCHLORIDE 10 MG/1
10 TABLET ORAL 2 TIMES DAILY
Qty: 60 TAB | Refills: 0 | Status: SHIPPED | OUTPATIENT
Start: 2018-10-22 | End: 2018-11-21

## 2018-10-22 NOTE — PROGRESS NOTES
[facility] Sleep Center Follow Up Note     Date: 10/22/2018 / Time: 3:03 PM    Patient ID:   Name:             Allison Kulkarni   YOB: 1980  Age:                 38 y.o.  female   MRN:               [id number]      Thank you for requesting a sleep medicine consultation on Allison Kulkarni at the sleep center. She presents today with the chief complaints of narcolespy follow up.     HISTORY OF PRESENT ILLNESS:       Pt is currently on ritalin 10 mg. She was suppose to wean off of effexor however she is down to 75 mgs. Even though the symptoms of RLS continues intermittently she doesn't want to d/c due to possible relapse of depression and HA. With starting of ritalin she feels more awake in the morning. The effects of wakefulness last for about 4 hrs. However she has relapse of sleepiness in the afternoon. She continues to get ride and has not been driving at all at the work due to her symptoms.  She goes to sleep around 9 pm and wakes up around 5 am. She is getting about 8 hrs of sleep on a good night and about 6.5-7 hr of sleep on a bad night. The bad nights are about 1-2 per week.     In past she has tried provigil and nuvigil w/o much improvement. Since last visit the symptoms of cataplexy of weakness in legs and facial numbness has decreased to 1-2 per week, sleep paralysis is also decreased to 1 per week. She had only 1 episode of hypnagogic hallucination.       SLEEP STUDIES  normal apnea hypopnea index of 0.3/hr. There was a total of 0 apneas and 2 hypopneas. In the supine position the respiratory disturbance index was 0 an hour and in the non-supine position the respiratory disturbance index was 1.1 per hour. The respiratory events were associated with O2 nakul of 93% and average O2 saturation of 96%. She spent 0 % of sleep time below 89% O2 saturation. MSLT showed sleep latency of 5:15 min however no SPREMPs was seen. The REM latency on PSG was normal as well. She has anxiety  disorder and currently on effexor and remeron. She did not have wash out period before her sleep studies.    REVIEW OF SYSTEMS:       Constitutional: Denies fevers, Denies weight changes  Eyes: Denies changes in vision, no eye pain  Ears/Nose/Throat/Mouth: Denies nasal congestion or sore throat   Cardiovascular: Denies chest pain or palpitations   Respiratory: Denies shortness of breath , Denies cough  Gastrointestinal/Hepatic: Denies abdominal pain, nausea, vomiting, diarrhea, constipation or GI bleeding   Genitourinary: Denies bladder dysfunction, dysuria or frequency  Musculoskeletal/Rheum: Denies  joint pain and swelling   Skin/Breast: Denies rash,   Neurological: Denies headache, confusion, memory loss or focal weakness/parasthesias  Psychiatric: denies mood disorder     Comprehensive review of systems form is reviewed with the patient and is attached in the EMR.     PMH:  has a past medical history of Allergy; Asthma (3/27/2015); Back pain; Bronchitis; Chickenpox; Depression; Nasal drainage; and Seasonal allergies (3/27/2015). She also has no past medical history of ASTHMA.  MEDS:   Current Outpatient Prescriptions:   •  venlafaxine (EFFEXOR XR) 150 MG extended-release capsule, Take 1 Cap by mouth every day., Disp: 30 Cap, Rfl: 3  •  venlafaxine XR (EFFEXOR XR) 37.5 MG CAPSULE SR 24 HR, Take 1 Cap by mouth every day., Disp: 30 Cap, Rfl: 3  •  Multiple Vitamin (MULTI-VITAMIN PO), Take  by mouth., Disp: , Rfl:   •  Cholecalciferol (VITAMIN D PO), Take  by mouth., Disp: , Rfl:   •  albuterol (VENTOLIN HFA) 108 (90 Base) MCG/ACT Aero Soln inhalation aerosol, Inhale 2 Puffs by mouth every 6 hours as needed., Disp: 8.5 g, Rfl: 3  •  mirtazapine (REMERON) 30 MG TABS tablet, Take 2 Tabs by mouth every bedtime. (Patient taking differently: Take 15 mg by mouth every bedtime.), Disp: 60 Tab, Rfl: 5  •  fluticasone (FLONASE) 50 MCG/ACT nasal spray, Spray 1 Spray in nose every day., Disp: 16 g, Rfl: 5  •  benzonatate  "(TESSALON) 100 MG CAPS, Take 1 Cap by mouth 3 times a day as needed for Cough., Disp: 15 Cap, Rfl: 0  •  methylphenidate (RITALIN) 10 MG Tab, Take 1 Tab by mouth every day for 30 days., Disp: 30 Each, Rfl: 0  •  venlafaxine XR (EFFEXOR XR) 75 MG CAPSULE SR 24 HR, Take 1 Cap by mouth every day., Disp: 30 Cap, Rfl: 1  •  venlafaxine (EFFEXOR-XR) 150 MG extended-release capsule, Take 1 Cap by mouth every day. (Patient taking differently: Take 75 mg by mouth every day.), Disp: 30 Cap, Rfl: 5  ALLERGIES:   Allergies   Allergen Reactions   • Latex Unspecified     rash     SURGHX: No past surgical history on file.  SOCHX:  reports that she quit smoking about 4 years ago. She has a 5.00 pack-year smoking history. She has never used smokeless tobacco. She reports that she drinks alcohol. She reports that she does not use drugs..  FH:   Family History   Problem Relation Age of Onset   • Other Mother         sleep walking   • Heart Attack Father    • Heart Failure Paternal Grandmother    • Heart Attack Paternal Grandfather          Physical Exam:  Vitals/ General Appearance:   Weight/BMI: Body mass index is 22.13 kg/m².  Blood pressure 130/82, pulse 77, resp. rate 15, height 1.651 m (5' 5\"), weight 60.3 kg (133 lb), SpO2 97 %, not currently breastfeeding.  Vitals:    10/22/18 1446   BP: 130/82   BP Location: Right arm   Patient Position: Sitting   BP Cuff Size: Adult   Pulse: 77   Resp: 15   SpO2: 97%   Weight: 60.3 kg (133 lb)   Height: 1.651 m (5' 5\")       Pt. is alert and oriented to time, place and person. Cooperative and in no apparent distress.       1. Head: Atraumatic, normocephalic.   2. Ears: Normal tympanic membrane and no discharge  3. Nose: No inferior turbinate hypertophy,  4. Throat: Oropharynx appears adequate in that the palate is  overhanging .   5. Neck: Supple. No thyromegaly  6. Chest: Trachea central, no spine deformity   7. Lungs auscultation: B/L good air entry, vesicular breath sounds, no  " Wheezing  8. Heart auscultation: 1st and 2nd heart sounds normal, regular rhythm. No  murmur.  9.  Extremities: no clubbing, no pedal edema.  10 Skin: No rash  11. NEUROLOGICAL EXAMINATION: On neurological exam, the patient was alert and oriented x3. speech was clear and fluent without dysarthria.      INVESTIGATIONS:           ASSESSMENT AND PLAN        1.Narcolepsy with Cataplexy: Patient has symptoms of hypersomnolence with symptoms of REM intrusions including sleep paralysis and cataplexy. MSLT was negative for SOREMPs. However the MSLT was done on Effexor which is a REM suppressing medication as well as used as a treatment of cataplexy. Due to MSLT done of remeron and SSRI it is hard to r/o narcolepsy.  Meanwhile, she is is cautioned against drowsy driving and operating heavy machinery. If she feels sleepy while driving, she must pull over for a break/nap, rather than persist on the road, in the interest of her own safety and that of others on the road           Plan        - even tough the MSLT is negative for narcolepsy, I have high suspicion of narcolepsy w/ cataplexy based on her symptoms                     - Increasing dose to ritalin 10mg BID.        - Continue Effexor however she has break through cataplexy symptoms        - We will get pre authorization for Xyrem if needed. Starting on xyrem 4.5 g qhs          - HLA DQB1 *06:02 was negative   - Readdressed Therapeutic nap schedule of taking 20-30 min naps every 3 hours is recommended     nuvigil is prescribed. I have obtained and reviewed patient utilization report from [employer] prior to writing prescription for controlled substance II, III or IV. Based on the report and my clinical assessment the prescription is medically necessary. Pt was advised to use it appropriately. Side affects were discussed in detail    2. Depression: under control with effexor 75 mg. She was managed by psychiatrist in past however she has not seen them in last few months.  She is looking for a new psychiatrist therefore referral is placed today. She will have a formal evaluation for depression prior to the start of xyrem since it can exacerbate depression.     3. Chronic HA: currently stable however she has break through HA that can last and does not respond to OTC medication. referral to neurology.

## 2018-10-24 ENCOUNTER — TELEPHONE (OUTPATIENT)
Dept: SLEEP MEDICINE | Facility: MEDICAL CENTER | Age: 38
End: 2018-10-24

## 2018-10-24 NOTE — TELEPHONE ENCOUNTER
Called pt asking to see if it was possible for her to stop by the [facility] in the afternoon. Dr. Spencer has been in the process of trying to get Xyrem prescribed to the pt and we need her signature on enrollment form that was faxed to us. I also informed her that Dr. Spencer would like to speak to her regarding this as well.  Pt is welling to come in and will be at the [facility] around 3:30 PM

## 2018-10-29 ENCOUNTER — TELEPHONE (OUTPATIENT)
Dept: PULMONOLOGY | Facility: HOSPICE | Age: 38
End: 2018-10-29

## 2018-10-29 ENCOUNTER — TELEPHONE (OUTPATIENT)
Dept: SLEEP MEDICINE | Facility: MEDICAL CENTER | Age: 38
End: 2018-10-29

## 2018-10-29 NOTE — TELEPHONE ENCOUNTER
The patient needs a prior authorization for medication Xyrem. The Xyrem prior authorization request form is filled out by Dr. Spencer and is scan into media. Hopefully Megan can helps out this.

## 2018-10-29 NOTE — TELEPHONE ENCOUNTER
MEDICATION PRIOR AUTHORIZATION NEEDED:    1. Name of Medication: Xyrem 500 mg/ml    2. Requested By (Name of Pharmacy): [facility]     3. Is insurance on file current? yes    4. What is the name & phone number of the 3rd party payor? [employer] [phone]

## 2018-11-09 NOTE — TELEPHONE ENCOUNTER
DOCUMENTATION OF PRIOR AUTH STATUS    1. Medication name and dose: Xyrem 500 mg/ml    2. Name and Phone # of Prescription coverage company: [employer] [phone]    3. Date Prior Auth was submitted: 10/29/2018    4. What information was given to obtain insurance decision: Clinical notes    5. Prior Auth letter Approved or Denied: Approved through 1/29/2019    6. Pharmacy notified: Yes    7. Patient notified: Yes

## 2018-11-28 ENCOUNTER — PATIENT MESSAGE (OUTPATIENT)
Dept: SLEEP MEDICINE | Facility: MEDICAL CENTER | Age: 38
End: 2018-11-28

## 2018-11-28 NOTE — PATIENT COMMUNICATION
She was called back on 9/28/18 to discuss her concern. She was started on xyrem 2.25 g 2x per night. The symptoms of sleep paralysis and cataplexy was over all improved and had it only 1-2 times in last 2 weeks. She increased the dose to 3 g last night had had nausea and upset stomach therefore she did not take the second dose. Since the REM phenomena of sleep paralysis and cataplexy was improved on 2.25 g 2z night, recommended to continue that dose rather increasing it. Her concern was answered to her satisfaction.

## 2018-12-04 ENCOUNTER — SLEEP CENTER VISIT (OUTPATIENT)
Dept: SLEEP MEDICINE | Facility: MEDICAL CENTER | Age: 38
End: 2018-12-04
Payer: COMMERCIAL

## 2018-12-04 VITALS
SYSTOLIC BLOOD PRESSURE: 122 MMHG | HEART RATE: 70 BPM | DIASTOLIC BLOOD PRESSURE: 80 MMHG | HEIGHT: 65 IN | BODY MASS INDEX: 22.33 KG/M2 | OXYGEN SATURATION: 98 % | WEIGHT: 134 LBS | RESPIRATION RATE: 15 BRPM

## 2018-12-04 DIAGNOSIS — G47.411 NARCOLEPSY CATAPLEXY SYNDROME: ICD-10-CM

## 2018-12-04 DIAGNOSIS — R40.0 DAYTIME SOMNOLENCE: ICD-10-CM

## 2018-12-04 DIAGNOSIS — F32.A DEPRESSION, UNSPECIFIED DEPRESSION TYPE: ICD-10-CM

## 2018-12-04 PROCEDURE — 99214 OFFICE O/P EST MOD 30 MIN: CPT | Performed by: FAMILY MEDICINE

## 2018-12-04 RX ORDER — METHYLPHENIDATE HYDROCHLORIDE 10 MG/1
10 CAPSULE, EXTENDED RELEASE ORAL EVERY MORNING
Qty: 30 CAP | Refills: 1 | Status: SHIPPED | OUTPATIENT
Start: 2018-12-04 | End: 2018-12-31 | Stop reason: SDUPTHER

## 2018-12-04 NOTE — PROGRESS NOTES
[facility] Sleep Center Follow Up Note     Date: 12/4/2018 / Time: 2:39 PM    Patient ID:   Name:             Allison Kulkarni   YOB: 1980  Age:                 38 y.o.  female   MRN:               [id number]      Thank you for requesting a sleep medicine consultation on Allison Kulkarni at the sleep center. She presents today with the chief complaints of hypersomnia and narcolepsy follow up.     HISTORY OF PRESENT ILLNESS:       Pt is currently on ritalin 10 BID which was increased on last visit. She takes the ritalin at 6:30 am and then second dose at 10:30 am . The effects of ritalin last for 3-4 hrs and by the end of the work day she is has significant sleepiness. The EPWORTH today was 9/21. Since her last visit xyrem was approved as well for her cataplexy. She was started in 2.25 mg twice per night with significantly improved her symptoms of sleep paralysis and cataplexy. She the use of xyrem she only had one episode of cataplexy. She is till not driving independently. She titrated up to 3 g twice per night per recommendation however she was unable to tolerate and caused upset stomach. She is also on effexor XR 75 mg for her depression which has not helped with cataplexy in past even on higher doses of 150 mg.      She goes to sleep around 9:30 pm and wakes up around 5:30 am. She is getting about 7 hrs of sleep on a good night and about 6 hr of sleep on a bad night.The over all sleep has improved over all but still has EDS at the end of the day.    In past she has tried provigil and nuvigil w/o much improvement.     SLEEP HISTORY   normal apnea hypopnea index of 0.3/hr. There was a total of 0 apneas and 2 hypopneas. In the supine position the respiratory disturbance index was 0 an hour and in the non-supine position the respiratory disturbance index was 1.1 per hour. The respiratory events were associated with O2 nakul of 93% and average O2 saturation of 96%. She spent 0 % of  sleep time below 89% O2 saturation. MSLT showed sleep latency of 5:15 min however no SPREMPs was seen. The REM latency on PSG was normal as well. She has anxiety disorder and currently on effexor and remeron. She did not have wash out period before her sleep studies      REVIEW OF SYSTEMS:       Constitutional: Denies fevers, Denies weight changes  Eyes: Denies changes in vision, no eye pain  Ears/Nose/Throat/Mouth: Denies nasal congestion or sore throat   Cardiovascular: Denies chest pain or palpitations   Respiratory: Denies shortness of breath , Denies cough  Gastrointestinal/Hepatic: Denies abdominal pain, nausea, vomiting, diarrhea, constipation or GI bleeding   Genitourinary: Denies bladder dysfunction, dysuria or frequency  Musculoskeletal/Rheum: Denies  joint pain and swelling   Skin/Breast: Denies rash,   Neurological: Denies headache, confusion, memory loss or focal weakness/parasthesias  Psychiatric: denies mood disorder     Comprehensive review of systems form is reviewed with the patient and is attached in the EMR.     PMH:  has a past medical history of Allergy; Asthma (3/27/2015); Back pain; Bronchitis; Chickenpox; Depression; Nasal drainage; and Seasonal allergies (3/27/2015). She also has no past medical history of ASTHMA.  MEDS:   Current Outpatient Prescriptions:   •  Sodium Oxybate (XYREM PO), Take  by mouth., Disp: , Rfl:   •  methylphenidate (RITALIN LA) 10 MG SR capsule, Take 1 Cap by mouth every morning for 30 days., Disp: 30 Cap, Rfl: 1  •  methylphenidate (RITALIN) 10 MG Tab, Take 1 Tab by mouth 2 times a day for 30 days., Disp: 60 Tab, Rfl: 0  •  venlafaxine XR (EFFEXOR XR) 75 MG CAPSULE SR 24 HR, Take 1 Cap by mouth every day., Disp: 30 Cap, Rfl: 3  •  venlafaxine XR (EFFEXOR XR) 75 MG CAPSULE SR 24 HR, Take 1 Cap by mouth every day., Disp: 30 Cap, Rfl: 1  •  Multiple Vitamin (MULTI-VITAMIN PO), Take  by mouth., Disp: , Rfl:   •  Cholecalciferol (VITAMIN D PO), Take  by mouth., Disp: ,  "Rfl:   •  albuterol (VENTOLIN HFA) 108 (90 Base) MCG/ACT Aero Soln inhalation aerosol, Inhale 2 Puffs by mouth every 6 hours as needed., Disp: 8.5 g, Rfl: 3  •  venlafaxine (EFFEXOR-XR) 150 MG extended-release capsule, Take 1 Cap by mouth every day. (Patient taking differently: Take 75 mg by mouth every day.), Disp: 30 Cap, Rfl: 5  •  fluticasone (FLONASE) 50 MCG/ACT nasal spray, Spray 1 Spray in nose every day., Disp: 16 g, Rfl: 5  •  benzonatate (TESSALON) 100 MG CAPS, Take 1 Cap by mouth 3 times a day as needed for Cough., Disp: 15 Cap, Rfl: 0  ALLERGIES:   Allergies   Allergen Reactions   • Latex Unspecified     rash     SURGHX: No past surgical history on file.  SOCHX:  reports that she quit smoking about 4 years ago. She has a 5.00 pack-year smoking history. She has never used smokeless tobacco. She reports that she drinks alcohol. She reports that she does not use drugs..  FH:   Family History   Problem Relation Age of Onset   • Other Mother         sleep walking   • Heart Attack Father    • Heart Failure Paternal Grandmother    • Heart Attack Paternal Grandfather          Physical Exam:  Vitals/ General Appearance:   Weight/BMI: Body mass index is 22.3 kg/m².  Blood pressure 122/80, pulse 70, resp. rate 15, height 1.651 m (5' 5\"), weight 60.8 kg (134 lb), SpO2 98 %, not currently breastfeeding.  Vitals:    12/04/18 1443   BP: 122/80   BP Location: Right arm   Patient Position: Sitting   BP Cuff Size: Adult   Pulse: 70   Resp: 15   SpO2: 98%   Weight: 60.8 kg (134 lb)   Height: 1.651 m (5' 5\")       Pt. is alert and oriented to time, place and person. Cooperative and in no apparent distress.       1. Head: Atraumatic, normocephalic.   2. Ears: Normal tympanic membrane and no discharge  3. Nose: No inferior turbinate hypertophy   4. Throat: Oropharynx appears adequate in that the palate is overhanging   5. Neck: Supple. No thyromegaly  6. Chest: Trachea central, no spine deformity   7. Lungs auscultation: B/L " good air entry, vesicular breath sounds, no adventitious sounds  8. Heart auscultation: 1st and 2nd heart sounds normal, regular rhythm. No appreciable murmur.  9.  Extremities:  no pedal edema.  10. Skin: No rash  11. NEUROLOGICAL EXAMINATION: On neurological exam, the patient was alert and oriented x3. speech was clear and fluent without dysarthria.      INVESTIGATIONS:     .Narcolepsy with Cataplexy: Patient has symptoms of hypersomnolence with symptoms of REM intrusions including sleep paralysis and cataplexy. MSLT was negative for SOREMPs. However the MSLT was done on Effexor which is a REM suppressing medication as well as used as a treatment of cataplexy. Due to MSLT done of remeron and SSRI it is hard to r/o narcolepsy.  Meanwhile, she is is cautioned against drowsy driving and operating heavy machinery. If she feels sleepy while driving, she must pull over for a break/nap, rather than persist on the road, in the interest of her own safety and that of others on the road           Plan        - even tough the MSLT is negative for narcolepsy, I have high suspicion of narcolepsy w/ cataplexy based on her symptoms                     - Changing morning dose to ritalin XR 10 mg in morning at ritallin 0 mg in after PRN due to residal EDS        - Continue Effexor for depression        - continue xyrem at 2.25 g twice per night. Denies SE of worsening of depression or SI. She is still waiting to be seen by psych.                     - HLA DQB1 *06:02 was negative   - Readdressed Therapeutic nap schedule of taking 20-30 min naps every 3 hours is recommended    2. Depression: stable  with effexor 75 mg. Denies SI/HI and worsening of depression with the use of xyrem. She will have a formal evaluation for depression prior to the start of xyrem since it can exacerbate depression.         Narcotics: 70 (Value from [employer] System.)    Sedatives: 131 (Value from [facility] [facility].)    Stimulants: 201 (Value from [facility]  [facility].)     NARx[employer]ORES can range from 000 to 999. This first two digits represent the composite percentile risk based on an overall analysis of prescription drug use. The third digit represents the number of active prescriptions. The distribution of scores in the population is such that approximately 75% fall below 200, 95% fall below 500 and 99% fall below 650.     SELECT THE LINK BELOW TO REVIEW THE [employer] REPORT  or  SELECT THE 'ACCEPT' BUTTON TO CONTINUTE AFTER REVIEWING THE SCORES

## 2018-12-31 DIAGNOSIS — R40.0 DAYTIME SOMNOLENCE: ICD-10-CM

## 2018-12-31 DIAGNOSIS — G47.411 NARCOLEPSY CATAPLEXY SYNDROME: ICD-10-CM

## 2018-12-31 RX ORDER — METHYLPHENIDATE HYDROCHLORIDE 10 MG/1
10 TABLET ORAL 2 TIMES DAILY
COMMUNITY
End: 2018-12-31 | Stop reason: SDUPTHER

## 2018-12-31 NOTE — TELEPHONE ENCOUNTER
Have we ever prescribed this med? Yes.  If yes, what date? 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

## 2019-01-02 RX ORDER — METHYLPHENIDATE HYDROCHLORIDE 10 MG/1
10 TABLET ORAL 2 TIMES DAILY
Qty: 60 TAB | Refills: 0 | Status: SHIPPED | OUTPATIENT
Start: 2019-01-02 | End: 2019-02-01

## 2019-01-02 RX ORDER — METHYLPHENIDATE HYDROCHLORIDE 10 MG/1
10 CAPSULE, EXTENDED RELEASE ORAL EVERY MORNING
Qty: 30 CAP | Refills: 1 | Status: SHIPPED | OUTPATIENT
Start: 2019-01-02 | End: 2019-02-01

## 2019-01-04 ENCOUNTER — OFFICE VISIT (OUTPATIENT)
Dept: NEUROLOGY | Facility: MEDICAL CENTER | Age: 39
End: 2019-01-04
Payer: COMMERCIAL

## 2019-01-04 VITALS
BODY MASS INDEX: 21.66 KG/M2 | SYSTOLIC BLOOD PRESSURE: 120 MMHG | WEIGHT: 130 LBS | TEMPERATURE: 98.4 F | DIASTOLIC BLOOD PRESSURE: 70 MMHG | OXYGEN SATURATION: 99 % | HEART RATE: 88 BPM | HEIGHT: 65 IN

## 2019-01-04 DIAGNOSIS — G43.009 MIGRAINE WITHOUT AURA AND WITHOUT STATUS MIGRAINOSUS, NOT INTRACTABLE: ICD-10-CM

## 2019-01-04 PROCEDURE — 99204 OFFICE O/P NEW MOD 45 MIN: CPT | Performed by: PSYCHIATRY & NEUROLOGY

## 2019-01-04 RX ORDER — SUMATRIPTAN 50 MG/1
50 TABLET, FILM COATED ORAL
Qty: 10 TAB | Refills: 3 | Status: SHIPPED | OUTPATIENT
Start: 2019-01-04

## 2019-01-04 NOTE — PROGRESS NOTES
CC: Migraine      HPI:    Allison Kulkarni is a 38 y.o. Female with phx of Narcolepsy with cataplexy who presents today in initial neurologic consultation for migraine headaches. The patient was referred by their primary care provider, Finesse Manzano M.D., as well as her sleep specialist, Dr. Hugh Spencer. She is unaccompanied to today's visit.     Ms. Kulkarni was diagnosed with Narcolepsy with Cataplexy by Dr. Spencer. She is on Effexor for depression, although she questions whether she has depression or if her chronic tiredness from the Narcolepsy was mis-interpreted as depression. SHe has had migraine headaches for many years. They have always felt the same - a throbbing pain located mostly in the back of her head with phonophobia and mild photophobia, sometimes associated with nausea. They usually last for a few hours. She vomited only one time from one of her migraines. The frequency had increased to 2-3 per month, sometimes lasting for days at a time. Once she started Effexor, the migraine frequency decreased to about once a month. Whenever she has stopped it, the headaches have again increased.     Dr. Spencer was considering stopping the Effexor because it was giving her restless leg type symptoms. The only other medication she was ever prescribed for the migraines was Ultracet which did not help. She has noticed that the headaches she does get precede her menstrual cycle.     ROS:   Constitutional: No fevers or chills.  Eyes: No blurry vision or eye pain.  ENT: No dysphagia, + right>left hearing loss.  Respiratory: No cough or shortness of breath.  Cardiovascular: No chest pain or palpitations.  GI: No nausea, vomiting, or diarrhea.  : No urinary incontinence or dysuria.  Musculoskeletal: No joint swelling or arthralgias.  Skin: No skin rashes.  Neuro: + headaches, denies dizziness or tremors.  Endocrine: No heat or cold intolerance. No polydipsia or polyuria.  Psych: +  "depression.  Heme/Lymph: No easy bruising or swollen lymph nodes.      Past Medical History:   Past Medical History:   Diagnosis Date   • Asthma 3/27/2015   • Seasonal allergies 3/27/2015   • Allergy    • Back pain    • Bronchitis    • Chickenpox    • Depression    • Nasal drainage        Past Surgical History: History reviewed. No pertinent surgical history.    Social History:   Social History     Social History   • Marital status: Single     Spouse name: N/A   • Number of children: N/A   • Years of education: N/A     Occupational History   • Not on file.     Social History Main Topics   • Smoking status: Former Smoker     Packs/day: 0.50     Years: 10.00     Quit date: 3/27/2014   • Smokeless tobacco: Never Used      Comment: currently uses vape   • Alcohol use Yes      Comment: Very rare   • Drug use: No   • Sexual activity: No     Other Topics Concern   • Not on file     Social History Narrative   • No narrative on file       Family History:   Family History   Problem Relation Age of Onset   • Other Mother         sleep walking   • Heart Attack Father    • Heart Failure Paternal Grandmother    • Heart Attack Paternal Grandfather        Allergies:   Allergies   Allergen Reactions   • Latex Unspecified     rash       Physical Exam:     Ambulatory Vitals  Encounter Vitals  Temperature: 36.9 °C (98.4 °F)  Temp src: Temporal  Blood Pressure: 120/70  Pulse: 88  Pulse Oximetry: 99 %  Weight: 59 kg (130 lb)  Height: 165.1 cm (5' 5\")  BMI (Calculated): 21.63  Constitutional: Well-developed, well-nourished, good hygiene. Appears stated age.  Cardiovascular: RRR, with no murmurs, rubs or gallops. No carotid bruits.   Respiratory: Lungs CTA B/L, no W/R/R.   Abdomen: Soft Non-tender to Palpation. Non-distended.  Extremities: No peripheral edema, pedal pulses intact.   Skin: Warm, dry, intact. No rashes observed.  Eyes: Sclera anicteric  Neurologic:   Mental Status: Awake, alert, oriented x 3.   Speech: Fluent with normal " prosody.   Memory: Able to recall recent and remote events accurately.    Concentration: Attentive. Able to focus on history and follow multi-step commands.              Fund of Knowledge: Appropriate   Cranial Nerves:    CN II: PERRL. No afferent pupillary defect.    CN III, IV, VI: Good eye closure, EOMI.     CN V: Facial sensation intact and symmetric.     CN VII: No facial asymmetry.     CN VIII: Hearing intact.     CN IX and X: Palate elevates symmetrically. Normal gag reflex.    CN XI: Symmetric shoulder shrug.     CN XII: Tongue midline.    Sensory: Intact light touch, vibration and temperature.    Coordination: No evidence of past-pointing on finger to nose testing, no dysdiadochokinesia. Heel to shin intact.             DTR's: 2+ throughout without clonus.    Babinski: Toes downgoing bilaterally.   Romberg: Negative.   Movements: No tremors observed.   Musculoskeletal:    Strength: 5/5 in upper and lower extremities bilaterally.   Gait: Steady, narrow based.    Tone: Normal bulk and tone.   Joints: No swelling.     Labs:  Results for ROBERT ALVARADO (MRN [id number]) as of 1/4/2019 18:44   Ref. Range 5/4/2018 09:35   WBC Latest Ref Range: 4.8 - 10.8 K/uL 6.0   RBC Latest Ref Range: 4.20 - 5.40 M/uL 4.79   Hemoglobin Latest Ref Range: 12.0 - 16.0 g/dL 13.5   Hematocrit Latest Ref Range: 37.0 - 47.0 % 41.8   MCV Latest Ref Range: 81.4 - 97.8 fL 87.3   MCH Latest Ref Range: 27.0 - 33.0 pg 28.2   MCHC Latest Ref Range: 33.6 - 35.0 g/dL 32.3 (L)   RDW Latest Ref Range: 35.9 - 50.0 fL 40.9   Platelet Count Latest Ref Range: 164 - 446 K/uL 282   MPV Latest Ref Range: 9.0 - 12.9 fL 10.9   Neutrophils-Polys Latest Ref Range: 44.00 - 72.00 % 46.50   Neutrophils (Absolute) Latest Ref Range: 2.00 - 7.15 K/uL 2.77   Lymphocytes Latest Ref Range: 22.00 - 41.00 % 40.90   Lymphs (Absolute) Latest Ref Range: 1.00 - 4.80 K/uL 2.44   Monocytes Latest Ref Range: 0.00 - 13.40 % 9.00   Monos (Absolute) Latest Ref Range:  0.00 - 0.85 K/uL 0.54   Eosinophils Latest Ref Range: 0.00 - 6.90 % 2.50   Eos (Absolute) Latest Ref Range: 0.00 - 0.51 K/uL 0.15   Basophils Latest Ref Range: 0.00 - 1.80 % 0.80   Baso (Absolute) Latest Ref Range: 0.00 - 0.12 K/uL 0.05   Immature Granulocytes Latest Ref Range: 0.00 - 0.90 % 0.30   Immature Granulocytes (abs) Latest Ref Range: 0.00 - 0.11 K/uL 0.02   Nucleated RBC Latest Units: /100 WBC 0.00   NRBC (Absolute) Latest Units: K/uL 0.00   Sodium Latest Ref Range: 135 - 145 mmol/L 139   Potassium Latest Ref Range: 3.6 - 5.5 mmol/L 4.0   Chloride Latest Ref Range: 96 - 112 mmol/L 107   Co2 Latest Ref Range: 20 - 33 mmol/L 26   Anion Gap Latest Ref Range: 0.0 - 11.9  6.0   Glucose Latest Ref Range: 65 - 99 mg/dL 88   Bun Latest Ref Range: 8 - 22 mg/dL 15   Creatinine Latest Ref Range: 0.50 - 1.40 mg/dL 0.65   GFR If  Latest Ref Range: >60 mL/min/1.73 m 2 >60   GFR If Non  Latest Ref Range: >60 mL/min/1.73 m 2 >60   Calcium Latest Ref Range: 8.5 - 10.5 mg/dL 8.7   AST(SGOT) Latest Ref Range: 12 - 45 U/L 17   ALT(SGPT) Latest Ref Range: 2 - 50 U/L 23   Alkaline Phosphatase Latest Ref Range: 30 - 99 U/L 44   Total Bilirubin Latest Ref Range: 0.1 - 1.5 mg/dL 0.6   Albumin Latest Ref Range: 3.2 - 4.9 g/dL 3.7   Total Protein Latest Ref Range: 6.0 - 8.2 g/dL 6.7   Globulin Latest Ref Range: 1.9 - 3.5 g/dL 3.0   A-G Ratio Latest Units: g/dL 1.2   Cholesterol,Tot Latest Ref Range: 100 - 199 mg/dL 133   Triglycerides Latest Ref Range: 0 - 149 mg/dL 43   HDL Latest Ref Range: >=40 mg/dL 66   LDL Latest Ref Range: <100 mg/dL 58   TSH Latest Ref Range: 0.380 - 5.330 uIU/mL 1.220       Imaging:   We reviewed Ms. Kulkarni Head CT w/o from 9/17/08.   IMPRESSION:    NEGATIVE NONCONTRAST CT SCAN OF THE HEAD.          Assessment/Plan:  Migraine without aura and without status migrainosus, not intractable  Ms. Kulkarni is a 37 yo F with Narcolepsy with Cataplexy with chronic migraine without  aura currently controlled with Effexor. Today we discussed starting her on an abortive agent given the migraines are only occurring about once a month. We discussed a trial of Imitrex 50mg - one tablet at headache onset and another 2 hours later if incomplete effect. We discussed side effects of shortness of breath, MI, stroke, chest tightness, and rash. She will follow up with me in 2 months so we can evaluate its effectiveness. If she stops the Effexor and the headache frequency increases again, we will discuss other prophylactic medications.     Plan:  1. Imitrex 50mg one tablet at migraine onset with one additional tablet after 2 hours if incomplete effect.       Estella Robbins D.O., M.P.H  MS specialist.   Board Certified Neurologist.  Neurology Clerkship Director, [facility].    Neurology,  [facility].   Tel: [phone]  Fax: [phone]

## 2019-01-05 NOTE — ASSESSMENT & PLAN NOTE
Ms. Kulkarni is a 39 yo F with Narcolepsy with Cataplexy with chronic migraine without aura currently controlled with Effexor. Today we discussed starting her on an abortive agent given the migraines are only occurring about once a month. We discussed a trial of Imitrex 50mg - one tablet at headache onset and another 2 hours later if incomplete effect. We discussed side effects of shortness of breath, MI, stroke, chest tightness, and rash. She will follow up with me in 2 months so we can evaluate its effectiveness. If she stops the Effexor and the headache frequency increases again, we will discuss other prophylactic medications.     Plan:  1. Imitrex 50mg one tablet at migraine onset with one additional tablet after 2 hours if incomplete effect.

## 2019-01-24 ENCOUNTER — PATIENT MESSAGE (OUTPATIENT)
Dept: SLEEP MEDICINE | Facility: MEDICAL CENTER | Age: 39
End: 2019-01-24

## 2019-03-04 ENCOUNTER — APPOINTMENT (OUTPATIENT)
Dept: SLEEP MEDICINE | Facility: MEDICAL CENTER | Age: 39
End: 2019-03-04
Payer: COMMERCIAL

## 2019-03-05 ENCOUNTER — SLEEP CENTER VISIT (OUTPATIENT)
Dept: SLEEP MEDICINE | Facility: MEDICAL CENTER | Age: 39
End: 2019-03-05
Payer: COMMERCIAL

## 2019-03-05 VITALS
HEART RATE: 78 BPM | HEIGHT: 65 IN | SYSTOLIC BLOOD PRESSURE: 118 MMHG | OXYGEN SATURATION: 97 % | RESPIRATION RATE: 15 BRPM | DIASTOLIC BLOOD PRESSURE: 72 MMHG | WEIGHT: 123 LBS | BODY MASS INDEX: 20.49 KG/M2

## 2019-03-05 DIAGNOSIS — F32.A DEPRESSION, UNSPECIFIED DEPRESSION TYPE: ICD-10-CM

## 2019-03-05 DIAGNOSIS — G47.411 NARCOLEPSY CATAPLEXY SYNDROME: ICD-10-CM

## 2019-03-05 PROCEDURE — 99213 OFFICE O/P EST LOW 20 MIN: CPT | Performed by: FAMILY MEDICINE

## 2019-03-05 RX ORDER — METHYLPHENIDATE HYDROCHLORIDE 10 MG/1
10 TABLET ORAL DAILY
Qty: 30 EACH | Refills: 0 | Status: SHIPPED | OUTPATIENT
Start: 2019-05-05 | End: 2019-06-04

## 2019-03-05 RX ORDER — METHYLPHENIDATE HYDROCHLORIDE 10 MG/1
10 TABLET ORAL 2 TIMES DAILY
Qty: 60 TAB | Refills: 0 | Status: SHIPPED | OUTPATIENT
Start: 2019-04-05 | End: 2019-05-05

## 2019-03-05 RX ORDER — METHYLPHENIDATE HYDROCHLORIDE 10 MG/1
10 TABLET ORAL DAILY
Qty: 30 TAB | Refills: 0 | Status: SHIPPED | OUTPATIENT
Start: 2019-03-05 | End: 2019-04-04

## 2019-03-05 RX ORDER — VENLAFAXINE HYDROCHLORIDE 75 MG/1
75 CAPSULE, EXTENDED RELEASE ORAL DAILY
Qty: 30 CAP | Refills: 6 | Status: SHIPPED | OUTPATIENT
Start: 2019-03-05 | End: 2019-04-04

## 2019-03-05 RX ORDER — METHYLPHENIDATE HYDROCHLORIDE 10 MG/1
10 CAPSULE, EXTENDED RELEASE ORAL EVERY MORNING
Qty: 30 CAP | Refills: 0 | Status: SHIPPED | OUTPATIENT
Start: 2019-03-05 | End: 2019-04-04

## 2019-03-05 RX ORDER — METHYLPHENIDATE HYDROCHLORIDE 10 MG/1
10 CAPSULE, EXTENDED RELEASE ORAL EVERY MORNING
Qty: 30 CAP | Refills: 0 | Status: SHIPPED | OUTPATIENT
Start: 2019-05-05 | End: 2019-06-04

## 2019-03-05 RX ORDER — METHYLPHENIDATE HYDROCHLORIDE 10 MG/1
10 TABLET ORAL DAILY
Qty: 30 EACH | Refills: 0 | Status: SHIPPED | OUTPATIENT
Start: 2019-04-05 | End: 2019-05-05

## 2019-03-05 NOTE — PROGRESS NOTES
[facility] Follow Up Note     Date: 3/5/2019 / Time: 3:14 PM    Patient ID:   Name:             Allison Kulkarni   YOB: 1980  Age:                 39 y.o.  female   MRN:               [id number]      Thank you for requesting a sleep medicine consultation on Allison Kulkarni at the sleep center. She presents today with the chief complaints of narcolepsy with cataplexy follow up.     HISTORY OF PRESENT ILLNESS:       Pt is currently on xyrem. She goes to sleep around 9 pm and wakes up around 5:30 am. She is getting about 6.5 hrs of sleep on a good night and about 4-5 hr of sleep on a bad night. The bad nights are about 1 per week. Overall,  She does finds her sleep refreshing. When She  wakes up in the morning, She does feel tired. She does not take regular naps. EPWORTh today is 13/24.    She is currently on xyrem 3 gm twice a night. She is also on ritalin er 10 mg which takes before work and ritalin 10 mg PRN in afternoon around 12 pm. She takes ritalin immediate release only 3-4 days per week. She is also on effexor 75 mg. The cataplexy is once a week and much mild in nature. However she continues to have EDS. The effects of ritalin last about 2-3 hrs.     Her drivers license in has been suspended due to her narcolepsy and is currently in review by [facility]    SLEEP HISTORY   normal apnea hypopnea index of 0.3/hr. There was a total of 0 apneas and 2 hypopneas. In the supine position the respiratory disturbance index was 0 an hour and in the non-supine position the respiratory disturbance index was 1.1 per hour. The respiratory events were associated with O2 nakul of 93% and average O2 saturation of 96%. She spent 0 % of sleep time below 89% O2 saturation. MSLT showed sleep latency of 5:15 min however no SPREMPs was seen. The REM latency on PSG was normal as well. She has anxiety disorder and currently on effexor and remeron. She did not have wash out period before her sleep  studies      REVIEW OF SYSTEMS:       Constitutional: Denies fevers, Denies weight changes  Eyes: Denies changes in vision, no eye pain  Ears/Nose/Throat/Mouth: Denies nasal congestion or sore throat   Cardiovascular: Denies chest pain or palpitations   Respiratory: Denies shortness of breath , Denies cough  Gastrointestinal/Hepatic: Denies abdominal pain, nausea, vomiting, diarrhea, constipation or GI bleeding   Genitourinary: Deniesdysuria or frequency  Musculoskeletal/Rheum: Denies  joint pain and swelling   Skin/Breast: Denies rash,   Neurological: Denies headache, confusion, memory loss or focal weakness/parasthesias  Psychiatric: denies mood disorder   Sleep: + EDS and occasional cataplexy     Comprehensive review of systems form is reviewed with the patient and is attached in the EMR.     PMH:  has a past medical history of Allergy; Asthma (3/27/2015); Back pain; Bronchitis; Chickenpox; Depression; Nasal drainage; and Seasonal allergies (3/27/2015). She also has no past medical history of ASTHMA.  MEDS:   Current Outpatient Prescriptions:   •  Cetirizine HCl (ZYRTEC ALLERGY PO), Take  by mouth., Disp: , Rfl:   •  Sodium Oxybate (XYREM PO), Take  by mouth., Disp: , Rfl:   •  Multiple Vitamin (MULTI-VITAMIN PO), Take  by mouth., Disp: , Rfl:   •  Cholecalciferol (VITAMIN D PO), Take  by mouth., Disp: , Rfl:   •  venlafaxine (EFFEXOR-XR) 150 MG extended-release capsule, Take 1 Cap by mouth every day., Disp: 30 Cap, Rfl: 5  •  SUMAtriptan (IMITREX) 50 MG Tab, Take 1 Tab by mouth Once PRN for Migraine for up to 1 dose. (Patient not taking: Reported on 3/5/2019), Disp: 10 Tab, Rfl: 3  •  venlafaxine XR (EFFEXOR XR) 75 MG CAPSULE SR 24 HR, Take 1 Cap by mouth every day., Disp: 30 Cap, Rfl: 3  •  venlafaxine XR (EFFEXOR XR) 75 MG CAPSULE SR 24 HR, Take 1 Cap by mouth every day., Disp: 30 Cap, Rfl: 1  •  albuterol (VENTOLIN HFA) 108 (90 Base) MCG/ACT Aero Soln inhalation aerosol, Inhale 2 Puffs by mouth every 6 hours  "as needed., Disp: 8.5 g, Rfl: 3  •  fluticasone (FLONASE) 50 MCG/ACT nasal spray, Spray 1 Spray in nose every day., Disp: 16 g, Rfl: 5  •  benzonatate (TESSALON) 100 MG CAPS, Take 1 Cap by mouth 3 times a day as needed for Cough. (Patient not taking: Reported on 1/4/2019), Disp: 15 Cap, Rfl: 0  ALLERGIES:   Allergies   Allergen Reactions   • Latex Unspecified     rash     SURGHX: No past surgical history on file.  SOCHX:  reports that she quit smoking about 4 years ago. She has a 5.00 pack-year smoking history. She has never used smokeless tobacco. She reports that she drinks alcohol. She reports that she does not use drugs..  FH:   Family History   Problem Relation Age of Onset   • Other Mother         sleep walking   • Heart Attack Father    • Heart Failure Paternal Grandmother    • Heart Attack Paternal Grandfather          Physical Exam:  Vitals/ General Appearance:   Weight/BMI: Body mass index is 20.47 kg/m².  Blood pressure 118/72, pulse 78, resp. rate 15, height 1.651 m (5' 5\"), weight 55.8 kg (123 lb), SpO2 97 %, not currently breastfeeding.  Vitals:    03/05/19 1455   BP: 118/72   BP Location: Right arm   Patient Position: Sitting   BP Cuff Size: Adult   Pulse: 78   Resp: 15   SpO2: 97%   Weight: 55.8 kg (123 lb)   Height: 1.651 m (5' 5\")       Pt. is alert and oriented to time, place and person. Cooperative and in no apparent distress.       1. Head: Atraumatic, normocephalic.   2. Ears: Normal tympanic membrane and no discharge  3. Nose: No inferior turbinate hypertophy, no septal deviation, no polyp.   4. Throat: Oropharynx appears adequate in that the palate is not overhanging  5. Neck: Supple. No thyromegaly  6. Chest: Trachea central, no spine deformity   7. Lungs auscultation: B/L good air entry, vesicular breath sounds, no adventitious sounds  8. Heart auscultation: 1st and 2nd heart sounds normal, regular rhythm. No appreciable murmur.  9. . Extremities: no clubbing, no pedal edema.  10. Skin: No " rash  11. NEUROLOGICAL EXAMINATION: On neurological exam, the patient was alert and oriented x3. speech was clear and fluent without dysarthria.      1.Narcolepsy with Cataplexy: Patient has symptoms of hypersomnolence with symptoms of REM intrusions including sleep paralysis and cataplexy. MSLT was negative for SOREMPs. However the MSLT was done on Effexor which is a REM suppressing medication as well as used as a treatment of cataplexy. Due to MSLT done of remeron and SSRI it is hard to r/o narcolepsy.  Meanwhile, she is is cautioned against drowsy driving and operating heavy machinery. If she feels sleepy while driving, she must pull over for a break/nap, rather than persist on the road, in the interest of her own safety and that of others on the road           Plan        - even tough the MSLT is negative for narcolepsy, I have high suspicion of narcolepsy w/ cataplexy based on her symptoms                     - Continue ritalin XR 10 mg in morning at ritallin 10 mg in after PRN due to residal EDS. Refilled #3        - Continue Effexor for depression refilled         - Increase xyrem at 3.5 g twice per night due to residual EDS. Denies SE of worsening of depression or SI.         - HLA DQB1 *06:02 was negative   - Readdressed Therapeutic nap schedule of taking 20-30 min naps every 3 hours is recommended      2. Depression: stable  with effexor 75 mg. Denies SI/HI and worsening of depression with the use of xyrem. She will have a formal evaluation for depression prior to the start of xyrem since it can exacerbate depression.

## 2019-05-13 ENCOUNTER — PATIENT MESSAGE (OUTPATIENT)
Dept: SLEEP MEDICINE | Facility: MEDICAL CENTER | Age: 39
End: 2019-05-13

## 2019-05-13 DIAGNOSIS — G47.411 NARCOLEPSY WITH CATAPLEXY: ICD-10-CM

## 2019-05-14 RX ORDER — METHYLPHENIDATE HYDROCHLORIDE 10 MG/1
10 CAPSULE, EXTENDED RELEASE ORAL EVERY MORNING
Qty: 30 CAP | Refills: 0 | Status: SHIPPED | OUTPATIENT
Start: 2019-05-14 | End: 2019-06-13

## 2019-05-14 NOTE — PATIENT COMMUNICATION
Narcotics: 120 (Value from [employer].)    Sedatives: 281 (Value from [employer].)    Stimulants: 220 (Value from [employer].)     NARxSCORES can range from 000 to 999. This first two digits represent the composite percentile risk based on an overall analysis of prescription drug use. The third digit represents the number of active prescriptions. The distribution of scores in the population is such that approximately 75% fall below 200, 95% fall below 500 and 99% fall below 650.     SELECT THE LINK BELOW TO REVIEW THE [facility] REPORT  or  SELECT THE 'ACCEPT' BUTTON TO CONTINUTE AFTER REVIEWING THE SCORES

## 2019-05-17 ENCOUNTER — TELEPHONE (OUTPATIENT)
Dept: SLEEP MEDICINE | Facility: MEDICAL CENTER | Age: 39
End: 2019-05-17

## 2019-05-17 NOTE — TELEPHONE ENCOUNTER
Pt called stating that she is traveling to the  on Monday and was just informed that she will need a letter from Dr. Spencer stating that she is on xyrem for medical reason. I asked the pt if there is any specific information needed in the letter and the pt stated that she doesn't know.  I informed the pt that I will inform Dr. Spencer of this and see if he willing write the letter for the pt and I will call her once it is done and ready to be picked up.    Please advise.

## 2019-05-17 NOTE — LETTER
May 17, 2019        Allison Kulkarni  [address]  [place] [address]        To Whom it may concern,        Allison Kulkarni is a patient at [facility]. She had narcolepsy with cataplexy and currently stable on xyrem and ritalin. I will really appreciate if she is allowed to travel with her medication since it is a medical necessity.       If you have any questions or concerns, please don't hesitate to call.        Sincerely,        Hugh Spencer MD    Electronically Signed

## 2019-05-17 NOTE — TELEPHONE ENCOUNTER
I called the pt to inform her that the letter that she requested is ready for  at the [place] and I apologize for the delay on this. I informed her she can come  today or early Monday morning. Pt informed me she will stop by before she leaves for her trip.  I also informed her that the letter will be up front for her.

## 2019-06-12 ENCOUNTER — SLEEP CENTER VISIT (OUTPATIENT)
Dept: SLEEP MEDICINE | Facility: MEDICAL CENTER | Age: 39
End: 2019-06-12
Payer: COMMERCIAL

## 2019-06-12 VITALS
RESPIRATION RATE: 15 BRPM | HEIGHT: 65 IN | OXYGEN SATURATION: 97 % | SYSTOLIC BLOOD PRESSURE: 128 MMHG | HEART RATE: 87 BPM | DIASTOLIC BLOOD PRESSURE: 82 MMHG | BODY MASS INDEX: 20.33 KG/M2 | WEIGHT: 122 LBS

## 2019-06-12 DIAGNOSIS — G47.411 NARCOLEPSY CATAPLEXY SYNDROME: ICD-10-CM

## 2019-06-12 PROCEDURE — 99214 OFFICE O/P EST MOD 30 MIN: CPT | Performed by: FAMILY MEDICINE

## 2019-06-12 RX ORDER — METHYLPHENIDATE HYDROCHLORIDE 10 MG/1
10 TABLET ORAL DAILY
Qty: 30 TAB | Refills: 0 | Status: SHIPPED | OUTPATIENT
Start: 2019-07-12 | End: 2019-08-11

## 2019-06-12 RX ORDER — METHYLPHENIDATE HYDROCHLORIDE 20 MG/1
20 CAPSULE, EXTENDED RELEASE ORAL EVERY MORNING
Qty: 30 CAP | Refills: 0 | Status: SHIPPED | OUTPATIENT
Start: 2019-07-12 | End: 2019-08-11

## 2019-06-12 RX ORDER — VENLAFAXINE HYDROCHLORIDE 37.5 MG/1
37.5 CAPSULE, EXTENDED RELEASE ORAL DAILY
Qty: 30 CAP | Refills: 1 | Status: SHIPPED | OUTPATIENT
Start: 2019-06-12

## 2019-06-12 RX ORDER — METHYLPHENIDATE HYDROCHLORIDE 20 MG/1
20 CAPSULE, EXTENDED RELEASE ORAL EVERY MORNING
Qty: 30 CAP | Refills: 0 | Status: SHIPPED | OUTPATIENT
Start: 2019-08-12 | End: 2019-09-11

## 2019-06-12 RX ORDER — METHYLPHENIDATE HYDROCHLORIDE 20 MG/1
20 CAPSULE, EXTENDED RELEASE ORAL EVERY MORNING
Qty: 30 CAP | Refills: 0 | Status: SHIPPED | OUTPATIENT
Start: 2019-06-12 | End: 2019-07-12

## 2019-06-12 RX ORDER — METHYLPHENIDATE HYDROCHLORIDE 10 MG/1
10 TABLET ORAL ONCE
Qty: 30 TAB | Refills: 0 | Status: SHIPPED | OUTPATIENT
Start: 2019-08-12 | End: 2019-08-12

## 2019-06-12 NOTE — PROGRESS NOTES
[facility] Follow Up Note     Date: 6/12/2019 / Time: 3:27 PM    Patient ID:   Name:             Allison Kulkarni   YOB: 1980  Age:                 39 y.o.  female   MRN:               [id number]      Thank you for requesting a sleep medicine consultation on Allison Kulkarni at the sleep center. She presents today with the chief complaints of narcolepsy w/ cataplexy  follow up.     HISTORY OF PRESENT ILLNESS:       She goes to sleep around 8-9 pm and wakes up around 5-6 am. She is getting about 8-9 hrs of sleep on a good night and about 6-7 hr of sleep on a bad night. The bad nights are most night per week. Overall, She doesnot finds her sleep refreshing. When She  wakes up in the morning, She does feel tired. She does not take regular naps.    She is currently on xyrem 3.5 gm twice a night. She takes first dose at 8-9 pm and second around 12-1 am. She is also on ritalin ER 10 mg which takes before work and ritalin 10 mg PRN in afternoon around 12 pm. She takes ritalin immediate release only 3-4 days per week. She is also on effexor 75 mg. The cataplexy is 1-2 a week and much mild in nature. Denies hypnagogic hallucination. However she continues to have EDS. The effects of first ritalin last until 10-11 am. She tales second does around 12 pm and it last until  2-3 pm     Her drivers license in has been suspended due to her narcolepsy and is currently in review by DME    Her migraines has significantly improved and down to once a month.     SLEEP HISTORY   normal apnea hypopnea index of 0.3/hr. There was a total of 0 apneas and 2 hypopneas. In the supine position the respiratory disturbance index was 0 an hour and in the non-supine position the respiratory disturbance index was 1.1 per hour. The respiratory events were associated with O2 nakul of 93% and average O2 saturation of 96%. She spent 0 % of sleep time below 89% O2 saturation. MSLT showed sleep latency of 5:15 min  however no SPREMPs was seen. The REM latency on PSG was normal as well. She has anxiety disorder and currently on effexor and remeron. She did not have wash out period before her sleep studies      REVIEW OF SYSTEMS:       Constitutional: Denies fevers, Denies weight changes  Eyes: Denies changes in vision, no eye pain  Ears/Nose/Throat/Mouth: Denies nasal congestion or sore throat   Cardiovascular: Denies chest pain or palpitations   Respiratory: Denies shortness of breath , Denies cough  Gastrointestinal/Hepatic: Denies abdominal pain, nausea, vomiting, diarrhea, constipation or GI bleeding   Genitourinary: Deniesdysuria or frequency  Musculoskeletal/Rheum: Denies  joint pain and swelling   Skin/Breast: Denies rash,   Neurological: Denies headache, confusion, memory loss or focal weakness/parasthesias  Psychiatric: denies mood disorder   Sleep: + EDS, improved cataplexy    Comprehensive review of systems form is reviewed with the patient and is attached in the EMR.     PMH:  has a past medical history of Allergy; Asthma (3/27/2015); Back pain; Bronchitis; Chickenpox; Depression; Nasal drainage; and Seasonal allergies (3/27/2015). She also has no past medical history of ASTHMA.  MEDS:   Current Outpatient Prescriptions:   •  methylphenidate (RITALIN LA) 10 MG SR capsule, Take 1 Cap by mouth every morning for 30 days., Disp: 30 Cap, Rfl: 0  •  Cetirizine HCl (ZYRTEC ALLERGY PO), Take  by mouth., Disp: , Rfl:   •  Sodium Oxybate (XYREM PO), Take  by mouth., Disp: , Rfl:   •  Multiple Vitamin (MULTI-VITAMIN PO), Take  by mouth., Disp: , Rfl:   •  Cholecalciferol (VITAMIN D PO), Take  by mouth., Disp: , Rfl:   •  albuterol (VENTOLIN HFA) 108 (90 Base) MCG/ACT Aero Soln inhalation aerosol, Inhale 2 Puffs by mouth every 6 hours as needed., Disp: 8.5 g, Rfl: 3  •  venlafaxine (EFFEXOR-XR) 150 MG extended-release capsule, Take 1 Cap by mouth every day., Disp: 30 Cap, Rfl: 5  •  fluticasone (FLONASE) 50 MCG/ACT nasal spray,  "Spray 1 Spray in nose every day., Disp: 16 g, Rfl: 5  •  SUMAtriptan (IMITREX) 50 MG Tab, Take 1 Tab by mouth Once PRN for Migraine for up to 1 dose. (Patient not taking: Reported on 3/5/2019), Disp: 10 Tab, Rfl: 3  •  venlafaxine XR (EFFEXOR XR) 75 MG CAPSULE SR 24 HR, Take 1 Cap by mouth every day., Disp: 30 Cap, Rfl: 1  •  benzonatate (TESSALON) 100 MG CAPS, Take 1 Cap by mouth 3 times a day as needed for Cough. (Patient not taking: Reported on 1/4/2019), Disp: 15 Cap, Rfl: 0  ALLERGIES:   Allergies   Allergen Reactions   • Latex Unspecified     rash     SURGHX: No past surgical history on file.  SOCHX:  reports that she quit smoking about 5 years ago. She has a 5.00 pack-year smoking history. She has never used smokeless tobacco. She reports that she drinks alcohol. She reports that she does not use drugs..  FH:   Family History   Problem Relation Age of Onset   • Other Mother         sleep walking   • Heart Attack Father    • Heart Failure Paternal Grandmother    • Heart Attack Paternal Grandfather          Physical Exam:  Vitals/ General Appearance:   Weight/BMI: Body mass index is 20.3 kg/m².  /82 (BP Location: Left arm, Patient Position: Sitting, BP Cuff Size: Adult)   Pulse 87   Resp 15   Ht 1.651 m (5' 5\")   Wt 55.3 kg (122 lb)   SpO2 97%   Vitals:    06/12/19 1522   BP: 128/82   BP Location: Left arm   Patient Position: Sitting   BP Cuff Size: Adult   Pulse: 87   Resp: 15   SpO2: 97%   Weight: 55.3 kg (122 lb)   Height: 1.651 m (5' 5\")       Pt. is alert and oriented to time, place and person. Cooperative and in no apparent distress.       -Head: Atraumatic, normocephalic.   -. Ears: Normal tympanic membrane and no discharge  -. Nose: No inferior turbinate hypertophy  -. Throat: Oropharynx appears adequate in that the palate is not overhanging -. Neck: Supple. No thyromegaly  -. Chest: Trachea central, no spine deformity   -. Lungs auscultation: B/L good air entry, vesicular breath sounds, no " adventitious sounds  -. Heart auscultation: 1st and 2nd heart sounds normal, regular rhythm. No appreciable murmur.  - Extremities: no clubbing, no pedal edema.  - Skin: No rash        ASSESSMENT AND PLAN    1.Narcolepsy with Cataplexy: Patient has symptoms of hypersomnolence with symptoms of REM intrusions including sleep paralysis and cataplexy. MSLT was negative for SOREMPs. However the MSLT was done on Effexor which is a REM suppressing medication as well as used as a treatment of cataplexy. Due to MSLT done of remeron and SSRI it is hard to r/o narcolepsy.  Meanwhile, she is is cautioned against drowsy driving and operating heavy machinery. If she feels sleepy while driving, she must pull over for a break/nap, rather than persist on the road, in the interest of her own safety and that of others on the road           Plan        - even tough the MSLT is negative for narcolepsy, I have high suspicion of narcolepsy w/ cataplexy based on her symptoms                     - Increase dose to ritalin XR 20 mg in morning. Refilled #3. Ritallin 10 mg in after PRN due to residal EDS #2        -  Since the cataplexy has improved with xyrem she would like to d/c Effexor. Wean down protocol was dicussed in detail. Decrease to 37.5 mg x 1.5  weeks, cut 37.5 mg tab in half x 1 week and then on alternative day x 1 week and then stop.         - Increase xyrem at 3.5 g twice per night due to residual EDS. Denies SE of worsening of depression or SI.         - HLA DQB1 *06:02 was negative   - Readdressed Therapeutic nap schedule of taking 20-30 min naps every 3 hours is recommended                2. Depression: stable Denies SI/HI and worsening of depression with the use of xyrem.

## 2019-06-12 NOTE — PATIENT INSTRUCTIONS
Methylphenidate extended-release tablets  What is this medicine?  METHYLPHENIDATE (meth il FEN i date) is used to treat attention-deficit hyperactivity disorder (ADHD). It is also used to treat narcolepsy.  This medicine may be used for other purposes; ask your health care provider or pharmacist if you have questions.  COMMON BRAND NAME(S): Concerta, Metadate ER, Methylin, Ritalin SR  What should I tell my health care provider before I take this medicine?  They need to know if you have any of these conditions:  -anxiety or panic attacks  -circulation problems in fingers and toes  -difficulty swallowing, problems with the esophagus, or a history of blockage of the stomach or intestines  -glaucoma  -hardening or blockages of the arteries or heart blood vessels  -heart disease or a heart defect  -high blood pressure  -history of a drug or alcohol abuse problem  -history of stroke  -liver disease  -mental illness  -motor tics, family history or diagnosis of Tourette's syndrome  -seizures  -suicidal thoughts, plans, or attempt; a previous suicide attempt by you or a family member  -thyroid disease  -an unusual or allergic reaction to methylphenidate, other medicines, foods, dyes, or preservatives  -pregnant or trying to get pregnant  -breast-feeding  How should I use this medicine?  Take this medicine by mouth with a glass of water. Follow the directions on the prescription label. Do not crush, cut, or chew the tablet. You may take this medicine with food. Take your medicine at regular intervals. Do not take it more often than directed. If you take your medicine more than once a day, try to take your last dose at least 8 hours before bedtime. This well help prevent the medicine from interfering with your sleep.  A special MedGuide will be given to you by the pharmacist with each prescription and refill. Be sure to read this information carefully each time.  Talk to your pediatrician regarding the use of this medicine in  children. While this drug may be prescribed for children as young as 6 years for selected conditions, precautions do apply.  Overdosage: If you think you have taken too much of this medicine contact a poison control center or emergency room at once.  NOTE: This medicine is only for you. Do not share this medicine with others.  What if I miss a dose?  If you miss a dose, take it as soon as you can. If it is almost time for your next dose, take only that dose. Do not take double or extra doses.  What may interact with this medicine?  Do not take this medicine with any of the following medications:  -lithium  -MAOIs like Carbex, Eldepryl, Marplan, Nardil, and Parnate  -other stimulant medicines for attention disorders, weight loss, or to stay awake  -procarbazine  This medicine may also interact with the following medications:  -atomoxetine  -caffeine  -certain medicines for blood pressure, heart disease, irregular heart beat  -certain medicines for depression, anxiety, or psychotic disturbances  -certain medicines for seizures like carbamazepine, phenobarbital, phenytoin  -cold or allergy medicines  -warfarin  This list may not describe all possible interactions. Give your health care provider a list of all the medicines, herbs, non-prescription drugs, or dietary supplements you use. Also tell them if you smoke, drink alcohol, or use illegal drugs. Some items may interact with your medicine.  What should I watch for while using this medicine?  Visit your doctor or health care professional for regular checks on your progress. This prescription requires that you follow special procedures with your doctor and pharmacy. You will need to have a new written prescription from your doctor or health care professional every time you need a refill.  This medicine may affect your concentration, or hide signs of tiredness. Until you know how this drug affects you, do not drive, ride a bicycle, use machinery, or do anything that  needs mental alertness.  Tell your doctor or health care professional if this medicine loses its effects, or if you feel you need to take more than the prescribed amount. Do not change the dosage without talking to your doctor or health care professional.  For males, contact your doctor or health care professional right away if you have an erection that lasts longer than 4 hours or if it becomes painful. This may be a sign of a serious problem and must be treated right away to prevent permanent damage.  Decreased appetite is a common side effect when starting this medicine. Eating small, frequent meals or snacks can help. Talk to your doctor if you continue to have poor eating habits. Height and weight growth of a child taking this medicine will be monitored closely.  Do not take this medicine close to bedtime. It may prevent you from sleeping.  The tablet shell for some brands of this medicine does not dissolve. This is normal. The tablet shell may appear whole in the stool. This is not a cause for concern.  If you are going to need surgery, a MRI, CT scan, or other procedure, tell your doctor that you are taking this medicine. You may need to stop taking this medicine before the procedure.  Tell your doctor or healthcare professional right away if you notice unexplained wounds on your fingers and toes while taking this medicine. You should also tell your healthcare provider if you experience numbness or pain, changes in the skin color, or sensitivity to temperature in your fingers or toes.  What side effects may I notice from receiving this medicine?  Side effects that you should report to your doctor or health care professional as soon as possible:  -allergic reactions like skin rash, itching or hives, swelling of the face, lips, or tongue  -changes in vision  -chest pain or chest tightness  -fast, irregular heartbeat  -fingers or toes feel numb, cool, painful  -hallucination, loss of contact with reality  -high  blood pressure  -males: prolonged or painful erection  -seizures  -severe headaches  -severe stomach pain, vomiting  -shortness of breath  -suicidal thoughts or other mood changes  -trouble swallowing  -trouble walking, dizziness, loss of balance or coordination  -uncontrollable head, mouth, neck, arm, or leg movements  -unusual bleeding or bruising  Side effects that usually do not require medical attention (report to your doctor or health care professional if they continue or are bothersome):  -anxious  -headache  -loss of appetite  -nausea  -trouble sleeping  -weight loss  This list may not describe all possible side effects. Call your doctor for medical advice about side effects. You may report side effects to FDA at 3-389-FDA-8841.  Where should I keep my medicine?  Keep out of the reach of children. This medicine can be abused. Keep your medicine in a safe place to protect it from theft. Do not share this medicine with anyone. Selling or giving away this medicine is dangerous and against the law.  This medicine may cause accidental overdose and death if taken by other adults, children, or pets. Mix any unused medicine with a substance like cat litter or coffee grounds. Then throw the medicine away in a sealed container like a sealed bag or a coffee can with a lid. Do not use the medicine after the expiration date.  Store at room temperature between 15 and 30 degrees C (59 and 86 degrees F). Protect from light and moisture. Keep container tightly closed.  NOTE: This sheet is a summary. It may not cover all possible information. If you have questions about this medicine, talk to your doctor, pharmacist, or health care provider.  © 2018 [employer]/Gold Standard (2015-09-08 15:32:32)

## 2019-09-12 ENCOUNTER — SLEEP CENTER VISIT (OUTPATIENT)
Dept: SLEEP MEDICINE | Facility: MEDICAL CENTER | Age: 39
End: 2019-09-12
Payer: COMMERCIAL

## 2019-09-12 VITALS
SYSTOLIC BLOOD PRESSURE: 108 MMHG | OXYGEN SATURATION: 98 % | DIASTOLIC BLOOD PRESSURE: 70 MMHG | WEIGHT: 120 LBS | BODY MASS INDEX: 19.99 KG/M2 | HEART RATE: 69 BPM | RESPIRATION RATE: 15 BRPM | HEIGHT: 65 IN

## 2019-09-12 DIAGNOSIS — G47.411 NARCOLEPSY CATAPLEXY SYNDROME: ICD-10-CM

## 2019-09-12 PROCEDURE — 99213 OFFICE O/P EST LOW 20 MIN: CPT | Performed by: FAMILY MEDICINE

## 2019-09-12 RX ORDER — METHYLPHENIDATE HYDROCHLORIDE 20 MG/1
20 CAPSULE, EXTENDED RELEASE ORAL EVERY MORNING
Qty: 30 CAP | Refills: 0 | Status: SHIPPED | OUTPATIENT
Start: 2019-09-12 | End: 2019-10-12

## 2019-09-12 RX ORDER — VENLAFAXINE HYDROCHLORIDE 75 MG/1
75 CAPSULE, EXTENDED RELEASE ORAL DAILY
Qty: 30 CAP | Refills: 6 | Status: SHIPPED | OUTPATIENT
Start: 2019-09-12

## 2019-09-12 RX ORDER — METHYLPHENIDATE HYDROCHLORIDE 20 MG/1
20 CAPSULE, EXTENDED RELEASE ORAL EVERY MORNING
Qty: 30 CAP | Refills: 0 | Status: SHIPPED | OUTPATIENT
Start: 2019-10-12 | End: 2019-11-11

## 2019-09-12 RX ORDER — METHYLPHENIDATE HYDROCHLORIDE 20 MG/1
20 CAPSULE, EXTENDED RELEASE ORAL EVERY MORNING
Qty: 30 CAP | Refills: 0 | Status: SHIPPED | OUTPATIENT
Start: 2019-11-12 | End: 2019-12-12

## 2019-09-12 NOTE — PROGRESS NOTES
[facility] Follow Up Note     Date: 9/12/2019 / Time: 2:56 PM    Patient ID:   Name:             Allison Kulkarni   YOB: 1980  Age:                 39 y.o.  female   MRN:               [id number]      Thank you for requesting a sleep medicine consultation on Allison Kulkarni at the sleep center. She presents today with the chief complaints of narcolepsy w/o cataplexy  follow up.     HISTORY OF PRESENT ILLNESS:      She goes to sleep around 9 pm and wakes up around 5:30 am. She is getting about 6-7 hrs of sleep on a good night and about 3-4 hr of sleep on a bad night. The bad nights are about few per week. Overall,  She doesnot finds her sleep refreshing. She does not take regular naps.     She is currently suppose to be on xyrem 3.5 gm twice a night. However she has been off of it for a couple weeks due to possible side effects from the medication. She was experiencing muscle jerks. Denies rash, hives or trouble breathing.  For EDS she is on Ritalin ER  20 mg which takes before work at 7-8 am and ritalin 10 mg PRN in afternoon. She takes ritalin immediate release only few days per week. Since she is off of xyrem, she continued effexor XR 75 mgs. The cataplexy is 2-3 a week but much milder in nature. Denies hypnagogic hallucination. However she continues to have EDS. The effects of first ritalin last until 12 pm which is improved compared to immediate release and ritalin ER 10 mg . She takes second does around 12 pm and it last until  2-3 pm. EPWORTH was not filled out on this appointment.    Her drivers license in has been suspended due to her narcolepsy and is currently in review by DME      SLEEP HISTORY   normal apnea hypopnea index of 0.3/hr. There was a total of 0 apneas and 2 hypopneas. In the supine position the respiratory disturbance index was 0 an hour and in the non-supine position the respiratory disturbance index was 1.1 per hour. The respiratory events were  associated with O2 nakul of 93% and average O2 saturation of 96%. She spent 0 % of sleep time below 89% O2 saturation. MSLT showed sleep latency of 5:15 min however no SPREMPs was seen. The REM latency on PSG was normal as well. She has anxiety disorder and currently on effexor and remeron. She did not have wash out period before her sleep studies      REVIEW OF SYSTEMS:       Constitutional: Denies fevers, Denies weight changes  Eyes: Denies changes in vision, no eye pain  Ears/Nose/Throat/Mouth: Denies nasal congestion or sore throat   Cardiovascular: Denies chest pain or palpitations   Respiratory: Denies shortness of breath , Denies cough  Gastrointestinal/Hepatic: Denies abdominal pain, nausea, vomiting, diarrhea, constipation or GI bleeding   Skin/Breast: Denies rash,   Neurological: Denies headache, confusion, + muscle jerks denies tremors   Psychiatric: denies mood disorder   Sleep: + EDS, improved cataplexy     Comprehensive review of systems form is reviewed with the patient and is attached in the EMR.     PMH:  has a past medical history of Allergy, Asthma (3/27/2015), Back pain, Bronchitis, Chickenpox, Depression, Nasal drainage, and Seasonal allergies (3/27/2015). She also has no past medical history of ASTHMA.  MEDS:   Current Outpatient Medications:   •  Cetirizine HCl (ZYRTEC ALLERGY PO), Take  by mouth., Disp: , Rfl:   •  Sodium Oxybate (XYREM PO), Take  by mouth., Disp: , Rfl:   •  Multiple Vitamin (MULTI-VITAMIN PO), Take  by mouth., Disp: , Rfl:   •  Cholecalciferol (VITAMIN D PO), Take  by mouth., Disp: , Rfl:   •  albuterol (VENTOLIN HFA) 108 (90 Base) MCG/ACT Aero Soln inhalation aerosol, Inhale 2 Puffs by mouth every 6 hours as needed., Disp: 8.5 g, Rfl: 3  •  venlafaxine (EFFEXOR-XR) 150 MG extended-release capsule, Take 1 Cap by mouth every day., Disp: 30 Cap, Rfl: 5  •  fluticasone (FLONASE) 50 MCG/ACT nasal spray, Spray 1 Spray in nose every day., Disp: 16 g, Rfl: 5  •  venlafaxine XR  "(EFFEXOR XR) 37.5 MG CAPSULE SR 24 HR, Take 1 Cap by mouth every day., Disp: 30 Cap, Rfl: 1  •  SUMAtriptan (IMITREX) 50 MG Tab, Take 1 Tab by mouth Once PRN for Migraine for up to 1 dose. (Patient not taking: Reported on 3/5/2019), Disp: 10 Tab, Rfl: 3  •  venlafaxine XR (EFFEXOR XR) 75 MG CAPSULE SR 24 HR, Take 1 Cap by mouth every day., Disp: 30 Cap, Rfl: 1  •  benzonatate (TESSALON) 100 MG CAPS, Take 1 Cap by mouth 3 times a day as needed for Cough. (Patient not taking: Reported on 1/4/2019), Disp: 15 Cap, Rfl: 0  ALLERGIES:   Allergies   Allergen Reactions   • Latex Unspecified     rash     SURGHX: No past surgical history on file.  SOCHX:  reports that she quit smoking about 5 years ago. She has a 5.00 pack-year smoking history. She has never used smokeless tobacco. She reports that she drinks alcohol. She reports that she does not use drugs..  FH:   Family History   Problem Relation Age of Onset   • Other Mother         sleep walking   • Heart Attack Father    • Heart Failure Paternal Grandmother    • Heart Attack Paternal Grandfather          Physical Exam:  Vitals/ General Appearance:   Weight/BMI: Body mass index is 19.97 kg/m².  /70 (BP Location: Right arm, Patient Position: Sitting, BP Cuff Size: Adult)   Pulse 69   Resp 15   Ht 1.651 m (5' 5\")   Wt 54.4 kg (120 lb)   SpO2 98%   Vitals:    09/12/19 1446   BP: 108/70   BP Location: Right arm   Patient Position: Sitting   BP Cuff Size: Adult   Pulse: 69   Resp: 15   SpO2: 98%   Weight: 54.4 kg (120 lb)   Height: 1.651 m (5' 5\")       Pt. is alert and oriented to time, place and person. Cooperative and in no apparent distress.       -Head: Atraumatic, normocephalic.   -. Nose: No inferior turbinate hypertophy.   -. Throat: Oropharynx appears adequate in that the palate is  overhanging   -. Neck: Supple. No thyromegaly  -. Chest: Trachea central, no spine deformity   -. Lungs auscultation: B/L good air entry, vesicular breath sounds, no " adventitious sounds  -. Heart auscultation: 1st and 2nd heart sounds normal, regular rhythm. No appreciable murmur.  - Extremities: no clubbing  - Skin: No rash  - NEUROLOGICAL EXAMINATION: On neurological exam, the patient was alert and oriented x3. speech was clear and fluent without dysarthria.      ASSESSMENT AND PLAN     1.Narcolepsy with Cataplexy: Patient has symptoms of hypersomnolence with symptoms of REM intrusions including sleep paralysis and cataplexy. MSLT was negative for SOREMPs. However the MSLT was done on Effexor which is a REM suppressing medication as well as used as a treatment of cataplexy. Due to MSLT done of remeron and SSRI it is hard to r/o narcolepsy.  Meanwhile, she is is cautioned against drowsy driving and operating heavy machinery. If she feels sleepy while driving, she must pull over for a break/nap, rather than persist on the road, in the interest of her own safety and that of others on the road           Plan        - even tough the MSLT is negative for narcolepsy, I have high suspicion of narcolepsy w/ cataplexy based on her symptoms                     - Continue ritalin XR 20 mg in morning. Refilled #3. Ritallin 10 mg in after PRN due to residal EDS        - Continue effexor ER 75 mg since she is off of xyrem        - Recommended to restart xyrem slowly by taking 2.5 gram once a night, then increasing to 2.5 g twice a night and then 3 g twice a night. However if the side effects restart, call to discuss and also call [facility]. Denies SE of worsening of depression or SI.         - HLA DQB1 *06:02 was negative   - Readdressed Therapeutic nap schedule of taking 20-30 min naps every 3 hours is recommended                2. Depression: stable Denies SI/HI and worsening of depression with the use of xyrem.      minimum assist (75% patients effort)

## 2019-11-07 ENCOUNTER — PATIENT MESSAGE (OUTPATIENT)
Dept: SLEEP MEDICINE | Facility: MEDICAL CENTER | Age: 39
End: 2019-11-07

## 2019-11-12 ENCOUNTER — PATIENT MESSAGE (OUTPATIENT)
Dept: SLEEP MEDICINE | Facility: MEDICAL CENTER | Age: 39
End: 2019-11-12

## 2019-11-12 DIAGNOSIS — G47.411 NARCOLEPSY WITH CATAPLEXY: ICD-10-CM

## 2019-11-12 DIAGNOSIS — F33.1 MODERATE EPISODE OF RECURRENT MAJOR DEPRESSIVE DISORDER (HCC): ICD-10-CM

## 2019-11-12 NOTE — TELEPHONE ENCOUNTER
From: Allison Kulkarni  To: Hugh Spencer M.D.  Sent: 11/12/2019 10:48 AM PST  Subject: Non-Urgent Medical Question    I understand, it absolutely makes sense to have a psychiatrist in the mix. The last office I went to was Dr. Alvarenga and [facility], I'd prefer not to see anyone there. I have enough Effexor to double up for now, but it probably won't last until I get an appointment with a psychiatrist. Last time I had to wait a couple months to be seen, but that office was pretty disorganized.    ----- Message -----  From: Hugh Spencer M.D.  Sent: 11/12/19, 10:40 AM  To: Allison Kulkarni  Subject: RE: Non-Urgent Medical Question    I understand, unlike your other doctors, you want to have a psychiatrist/psychologist that can connect to you. However xyrem can exacerbate depression to dangerous level and I would feel more comfortable if you have a psychiatrist on board just incase me need to manage medications. I can place the referral and ask them to find a psychiatrist that is in network for you. Do you mind if I can know the name of your previous doctor so they don't refer you back to the same one.     Also do you have enough medication to double up on effexor, or do you want me to send a new script for 150 mg?    Hugh Spencer M.D.      ----- Message -----   From: Allison Kulkarni   Sent: 11/7/2019 10:01 AM PST   To: Hugh Spencer M.D.  Subject: Non-Urgent Medical Question    Hi Dr Spencer,    I've been back on the Xyrem for a while now and the negative symptoms I was having before have not returned. I have been having a different problem though, since I started taking it again I've gotten more depressed. I think it was making the depression worse before too, I just didn't realize it until I took a break from it. At this point, I really don't want to give up on the Xyrem. Would increasing the Effexor help?    Thanks,  Allison

## 2019-12-09 ENCOUNTER — TELEPHONE (OUTPATIENT)
Dept: PULMONOLOGY | Facility: HOSPICE | Age: 39
End: 2019-12-09

## 2019-12-09 NOTE — TELEPHONE ENCOUNTER
DOCUMENTATION OF PRIOR AUTH STATUS    1. Medication name and dose: Xyrem 500 mg    2. Name and Phone # of Prescription coverage company: [employer] [phone]    3. Date Prior Auth was submitted: 12/6/2019    4. What information was given to obtain insurance decision: Clinical notes    5. Prior Auth letter Approved or Denied: Approved through 12/5/2020    6. Pharmacy notified: Yes    7. Patient notified: Yes

## 2019-12-09 NOTE — TELEPHONE ENCOUNTER
MEDICATION PRIOR AUTHORIZATION NEEDED:    1. Name of Medication: Xyrem 500 mg    2. Requested By (Name of Pharmacy): [facility]     3. Is insurance on file current? yes    4. What is the name & phone number of the 3rd party payor? [employer] [phone]

## 2019-12-19 ENCOUNTER — SLEEP CENTER VISIT (OUTPATIENT)
Dept: SLEEP MEDICINE | Facility: MEDICAL CENTER | Age: 39
End: 2019-12-19
Payer: COMMERCIAL

## 2019-12-19 VITALS
RESPIRATION RATE: 14 BRPM | HEART RATE: 92 BPM | DIASTOLIC BLOOD PRESSURE: 82 MMHG | BODY MASS INDEX: 20.49 KG/M2 | HEIGHT: 65 IN | WEIGHT: 123 LBS | SYSTOLIC BLOOD PRESSURE: 130 MMHG | OXYGEN SATURATION: 97 %

## 2019-12-19 DIAGNOSIS — G47.411 NARCOLEPSY CATAPLEXY SYNDROME: ICD-10-CM

## 2019-12-19 PROCEDURE — 99213 OFFICE O/P EST LOW 20 MIN: CPT | Performed by: FAMILY MEDICINE

## 2019-12-19 RX ORDER — METHYLPHENIDATE HYDROCHLORIDE EXTENDED RELEASE 20 MG/1
TABLET ORAL
COMMUNITY
Start: 2019-11-29 | End: 2021-07-30 | Stop reason: SDUPTHER

## 2019-12-19 RX ORDER — METHYLPHENIDATE HYDROCHLORIDE 10 MG/1
TABLET ORAL
COMMUNITY
Start: 2019-11-29 | End: 2020-08-26 | Stop reason: SDUPTHER

## 2019-12-19 NOTE — PROGRESS NOTES
[facility] Follow Up Note     Date: 12/19/2019 / Time: 2:13 PM    Patient ID:   Name:             Allison Kulkarni   YOB: 1980  Age:                 39 y.o.  female   MRN:               [id number]      Thank you for requesting a sleep medicine consultation on Allison Kulkarni at the sleep center. She presents today with the chief complaints of narcolepsy with cataplexy follow up.     HISTORY OF PRESENT ILLNESS:       Over all sleep has improved that she is sleeping about 6-7 hrs per night however she still has occasional awakenings. She still doesn't wake up rested tough. She has persistent EDS and if time permits, she does take a nap in the afternoon.  She is currently suppose to be on xyrem 3.5 gm twice a night. In past she was off for few weeks due to side effects. She was experiencing muscle jerks. However denies those symptoms anymore. Denies rash, hives or trouble breathing.  For EDS she is on Ritalin ER  20 mg which takes before work at 7-8 am and ritalin 10 mg PRN in afternoon. She takes ritalin immediate release only 3-4 days per week. Due to depression she is also on effexor  mg and has started seeing Dr. Barnard. The cataplexy is 2-3 a week but much milder in nature. Denies hypnagogic hallucination. However she continues to have EDS. EPWORTH is 12/24 .     Her drivers license in has been suspended due to her narcolepsy and is currently in review by [facility]      REVIEW OF SYSTEMS:       Constitutional: Denies fevers, Denies weight changes  Eyes: Denies changes in vision, no eye pain  Ears/Nose/Throat/Mouth: Denies nasal congestion or sore throat   Cardiovascular: Denies chest pain or palpitations   Respiratory: Denies shortness of breath , Denies cough  Gastrointestinal/Hepatic: Denies abdominal pain, nausea, vomiting, diarrhea, constipation or GI bleeding   Genitourinary: Deniesdysuria or frequency  Musculoskeletal/Rheum: Denies  joint pain and swelling    Skin/Breast: Denies rash,   Neurological: Denies headache, confusion, memory loss or focal weakness/parasthesias  Psychiatric: denies mood disorder   Sleep: + occasional cataplexy and + EDS    Comprehensive review of systems form is reviewed with the patient and is attached in the EMR.     PMH:  has a past medical history of Allergy, Asthma (3/27/2015), Back pain, Bronchitis, Chickenpox, Depression, Nasal drainage, and Seasonal allergies (3/27/2015). She also has no past medical history of ASTHMA.  MEDS:   Current Outpatient Medications:   •  methylphenidate (RITALIN) 10 MG Tab, TAKE 1 TABLET BY MOUTH EVERY DAY FOR 30 DAYS G47 DNF 58/12/19, Disp: , Rfl:   •  methylphenidate (METADATE ER) 20 MG CR tablet, TAKE ONE TABLET BY MOUTH IN THE MORNING G47 **[id number]**, Disp: , Rfl:   •  Sodium Oxybate (XYREM PO), Take  by mouth., Disp: , Rfl:   •  venlafaxine (EFFEXOR-XR) 150 MG extended-release capsule, Take 1 Cap by mouth every day., Disp: 30 Cap, Rfl: 5  •  venlafaxine XR (EFFEXOR XR) 75 MG CAPSULE SR 24 HR, Take 1 Cap by mouth every day. (Patient not taking: Reported on 12/19/2019), Disp: 30 Cap, Rfl: 6  •  venlafaxine XR (EFFEXOR XR) 37.5 MG CAPSULE SR 24 HR, Take 1 Cap by mouth every day. (Patient not taking: Reported on 12/19/2019), Disp: 30 Cap, Rfl: 1  •  Cetirizine HCl (ZYRTEC ALLERGY PO), Take  by mouth., Disp: , Rfl:   •  SUMAtriptan (IMITREX) 50 MG Tab, Take 1 Tab by mouth Once PRN for Migraine for up to 1 dose. (Patient not taking: Reported on 3/5/2019), Disp: 10 Tab, Rfl: 3  •  venlafaxine XR (EFFEXOR XR) 75 MG CAPSULE SR 24 HR, Take 1 Cap by mouth every day. (Patient not taking: Reported on 12/19/2019), Disp: 30 Cap, Rfl: 1  •  Multiple Vitamin (MULTI-VITAMIN PO), Take  by mouth., Disp: , Rfl:   •  Cholecalciferol (VITAMIN D PO), Take  by mouth., Disp: , Rfl:   •  albuterol (VENTOLIN HFA) 108 (90 Base) MCG/ACT Aero Soln inhalation aerosol, Inhale 2 Puffs by mouth every 6 hours as needed., Disp: 8.5 g, Rfl:  "3  •  fluticasone (FLONASE) 50 MCG/ACT nasal spray, Spray 1 Spray in nose every day., Disp: 16 g, Rfl: 5  •  benzonatate (TESSALON) 100 MG CAPS, Take 1 Cap by mouth 3 times a day as needed for Cough. (Patient not taking: Reported on 1/4/2019), Disp: 15 Cap, Rfl: 0  ALLERGIES:   Allergies   Allergen Reactions   • Latex Unspecified     rash     SURGHX: No past surgical history on file.  SOCHX:  reports that she quit smoking about 5 years ago. She has a 5.00 pack-year smoking history. She has never used smokeless tobacco. She reports current alcohol use. She reports that she does not use drugs..  FH:   Family History   Problem Relation Age of Onset   • Other Mother         sleep walking   • Heart Attack Father    • Heart Failure Paternal Grandmother    • Heart Attack Paternal Grandfather          Physical Exam:  Vitals/ General Appearance:   Weight/BMI: Body mass index is 20.47 kg/m².  Resp 14   Ht 1.651 m (5' 5\")   Wt 55.8 kg (123 lb)   Vitals:    12/19/19 1411   Resp: 14   Weight: 55.8 kg (123 lb)   Height: 1.651 m (5' 5\")       Pt. is alert and oriented to time, place and person. Cooperative and in no apparent distress.       -Head: Atraumatic, normocephalic.   -. Nose: No inferior turbinate hypertophy  -. Throat: Oropharynx appears adequate in that the palate is not overhanging  -. Neck: Supple. No thyromegaly  -. Chest: Trachea central, no spine deformity   -. Lungs auscultation: B/L good air entry, vesicular breath sounds, no wheezing  -. Heart auscultation: 1st and 2nd heart sounds normal, regular rhythm. No appreciable murmur.  - Extremities:  no pedal edema.  - Skin: No rash        ASSESSMENT AND PLAN        1.Narcolepsy with Cataplexy: Patient has symptoms of hypersomnolence with symptoms of REM intrusions including sleep paralysis and cataplexy. MSLT was negative for SOREMPs. However the MSLT was done on Effexor which is a REM suppressing medication as well as used as a treatment of cataplexy. Due to MSLT " done of remeron and SSRI it is hard to r/o narcolepsy.  Meanwhile, she is is cautioned against drowsy driving and operating heavy machinery. If she feels sleepy while driving, she must pull over for a break/nap, rather than persist on the road, in the interest of her own safety and that of others on the road           Plan        - even tough the MSLT is negative for narcolepsy, I have high suspicion of narcolepsy w/ cataplexy based on her symptoms                     - Continue ritalin XR 20 mg in morning.Ritallin 10 mg in after PRN due to residal EDS        - Continue effexor  mg         - sunosi was dicussed and she ould like to try it. 1 pack sunosi 75 mg and 1 pack of 150 mg was given  from the clinic. Recommended to hold off on ritalin XR in mean time but ok to use immediate  release.Side effects were discussed in detail.        - Due to residual cataplexy recommended to increased xyrem to 4 gram once a night.However if the side effects restart, call to discuss and also call [facility]. Denies SE of worsening of depression or SI.         - HLA DQB1 *06:02 was negative   - Readdressed Therapeutic nap schedule of taking 20-30 min naps every 3 hours is recommended  2. Regarding treatment of other past medical problems and general health maintenance,  She is urged to follow up with PCP.

## 2020-01-09 ENCOUNTER — PATIENT MESSAGE (OUTPATIENT)
Dept: SLEEP MEDICINE | Facility: MEDICAL CENTER | Age: 40
End: 2020-01-09

## 2020-01-22 ENCOUNTER — TELEPHONE (OUTPATIENT)
Dept: SLEEP MEDICINE | Facility: MEDICAL CENTER | Age: 40
End: 2020-01-22

## 2020-01-22 NOTE — TELEPHONE ENCOUNTER
Called pt to inform her that her Rx's for methylphenidate (RITALIN LA) 20 MG SR capsule and methylphenidate (RITALIN) 10 MG Tab are ready to be  at the front of the [facility]. I also informed the pt that Dr. Spencer stated that the pt would need a 3 month [facility]  For med refill and I was able to schedule the pt on 5/21/2020

## 2020-02-20 ENCOUNTER — TELEPHONE (OUTPATIENT)
Dept: PULMONOLOGY | Facility: HOSPICE | Age: 40
End: 2020-02-20

## 2020-02-20 NOTE — TELEPHONE ENCOUNTER
I just want to clarify the Xyrem Rx.  In your last note it says 4 grams once a night.  Is that correct or should it be 4 grams twice nightly?  Please advise, thank you.

## 2020-02-24 ENCOUNTER — TELEPHONE (OUTPATIENT)
Dept: PULMONOLOGY | Facility: HOSPICE | Age: 40
End: 2020-02-24

## 2020-02-24 NOTE — TELEPHONE ENCOUNTER
Called [facility] and spoke to Debby and informed her that Munira called wanting to inform the provider that patient has both Asthma and depression and were wondering if it was okay to ship the Xyrem.     I informed that per Dr. Spencer Yes it is ok to dispense xyrem. Her depression is under control and is currently under the treatment with effexor by a psychiatrist as well. She is aware of the side effects of xyrem exacerbating depression and to contact us and stop xyrem if depression is worsening.        Debby will make note of this and they will ship Xyrem to the pt ASAP

## 2020-02-24 NOTE — TELEPHONE ENCOUNTER
Yes it is ok to dispense xyrem. Her depression is under control and is currently under the treatment with effexor by a psychiatrist as well. She is aware of the side effects of xyrem exacerbating depression and to contact us and stop xyrem if depression is worsening.

## 2020-02-24 NOTE — TELEPHONE ENCOUNTER
Munira from [facility] called wanting to inform provider that patient has both Asthma and Depression and pharmacy was wondering if it would be okay to Ship Xyrem to patient    [facility] - [facility]  Ph. [phone] , Op 3, Op4     Please Advise    I will be leaving for the day at 1pm.

## 2020-05-21 ENCOUNTER — SLEEP CENTER VISIT (OUTPATIENT)
Dept: SLEEP MEDICINE | Facility: MEDICAL CENTER | Age: 40
End: 2020-05-21
Payer: COMMERCIAL

## 2020-05-21 VITALS
OXYGEN SATURATION: 96 % | HEART RATE: 92 BPM | HEIGHT: 65 IN | DIASTOLIC BLOOD PRESSURE: 74 MMHG | SYSTOLIC BLOOD PRESSURE: 118 MMHG | RESPIRATION RATE: 14 BRPM | WEIGHT: 125 LBS | BODY MASS INDEX: 20.83 KG/M2

## 2020-05-21 DIAGNOSIS — D48.9 NEOPLASM OF UNCERTAIN BEHAVIOR: ICD-10-CM

## 2020-05-21 DIAGNOSIS — G47.411 NARCOLEPSY CATAPLEXY SYNDROME: ICD-10-CM

## 2020-05-21 PROCEDURE — 99213 OFFICE O/P EST LOW 20 MIN: CPT | Performed by: FAMILY MEDICINE

## 2020-05-21 RX ORDER — METHYLPHENIDATE HYDROCHLORIDE 20 MG/1
20 CAPSULE, EXTENDED RELEASE ORAL EVERY MORNING
Qty: 30 CAP | Refills: 0 | Status: SHIPPED | OUTPATIENT
Start: 2020-07-21 | End: 2020-08-20

## 2020-05-21 RX ORDER — METHYLPHENIDATE HYDROCHLORIDE 20 MG/1
20 CAPSULE, EXTENDED RELEASE ORAL EVERY MORNING
Qty: 30 CAP | Refills: 0 | Status: SHIPPED | OUTPATIENT
Start: 2020-06-21 | End: 2020-07-21

## 2020-05-21 RX ORDER — METHYLPHENIDATE HYDROCHLORIDE 10 MG/1
10 TABLET ORAL DAILY
Qty: 30 TAB | Refills: 0 | Status: SHIPPED | OUTPATIENT
Start: 2020-06-21 | End: 2020-07-21

## 2020-05-21 RX ORDER — METHYLPHENIDATE HYDROCHLORIDE 20 MG/1
20 CAPSULE, EXTENDED RELEASE ORAL EVERY MORNING
Qty: 30 CAP | Refills: 0 | Status: SHIPPED | OUTPATIENT
Start: 2020-05-21 | End: 2020-06-20

## 2020-05-21 RX ORDER — METHYLPHENIDATE HYDROCHLORIDE 10 MG/1
10 TABLET ORAL DAILY
Qty: 30 TAB | Refills: 0 | Status: SHIPPED | OUTPATIENT
Start: 2020-05-21 | End: 2020-06-20

## 2020-05-21 RX ORDER — METHYLPHENIDATE HYDROCHLORIDE 10 MG/1
10 TABLET ORAL DAILY
Qty: 30 EACH | Refills: 0 | Status: SHIPPED | OUTPATIENT
Start: 2020-07-21 | End: 2020-08-20

## 2020-05-21 NOTE — PROGRESS NOTES
[facility] Follow Up Note     Date: 5/21/2020 / Time: 2:45 PM    Patient ID:   Name:             Allison Kulkarni   YOB: 1980  Age:                 40 y.o.  female   MRN:               [id number]      Thank you for requesting a sleep medicine consultation on Allison Kulkarni at the sleep center. She presents today with the chief complaints of narcolepsy with cataplexy  follow up.     HISTORY OF PRESENT ILLNESS:        She goes to sleep around 9 pm and wakes up around 6 am. She is getting about 7-8 hrs of sleep on a good night and about 5-6 hr of sleep on a bad night. The bad nights are about 1 per week. Overall,  She does not finds her sleep refreshing.  She does take regular naps. The naps are usually 20-30 min long..    She is currently suppose to be on xyrem 3.5 gm twice a night. Denies recent side effects. In past he was experiencing muscle jerks and through it was due to xyrem. For EDS she is on Ritalin ER  20 mg which takes before work at 6:30-7 am and ritalin 10 mg PRN in 11 am. She takes ritalin immediate release only 3-4 days per week. She tried sunosi and as per pt she did not find it helpful but she think that it was because it was during her period when she has more than usual EDS and anxiety. Due to depression she is also on effexor  mg and has started seeing Dr. Barnard. The cataplexy is couple week but much milder in nature. Denies hypnagogic hallucination or sleep paralysis. However she continues to have EDS. EPWORTH is 12/24 .     Her drivers license in has been suspended due to her narcolepsy and is currently in review by [facility]    REVIEW OF SYSTEMS:       Constitutional: Denies fevers, Denies weight changes  Eyes: Denies changes in vision, no eye pain  Ears/Nose/Throat/Mouth: Denies nasal congestion or sore throat   Cardiovascular: Denies chest pain or palpitations   Respiratory: Denies shortness of breath , Denies cough  Gastrointestinal/Hepatic:  Denies abdominal pain, nausea, vomiting, diarrhea, constipation or GI bleeding   Genitourinary: Deniesdysuria or frequency  Musculoskeletal/Rheum: Denies  joint pain and swelling   Skin/Breast: Denies rash,   Neurological: Denies headache, confusion, memory loss or focal weakness/parasthesias  Psychiatric: denies mood disorder   Sleep: Improved EDS    Comprehensive review of systems form is reviewed with the patient and is attached in the EMR.     PMH:  has a past medical history of Allergy, Asthma (3/27/2015), Back pain, Bronchitis, Chickenpox, Depression, Nasal drainage, and Seasonal allergies (3/27/2015). She also has no past medical history of ASTHMA.  MEDS:   Current Outpatient Medications:   •  methylphenidate (RITALIN) 10 MG Tab, TAKE 1 TABLET BY MOUTH EVERY DAY FOR 30 DAYS G47 DNF 58/12/19, Disp: , Rfl:   •  methylphenidate (METADATE ER) 20 MG CR tablet, TAKE ONE TABLET BY MOUTH IN THE MORNING G47 **[id number]**, Disp: , Rfl:   •  Cetirizine HCl (ZYRTEC ALLERGY PO), Take  by mouth., Disp: , Rfl:   •  Sodium Oxybate (XYREM PO), Take  by mouth., Disp: , Rfl:   •  Multiple Vitamin (MULTI-VITAMIN PO), Take  by mouth., Disp: , Rfl:   •  Cholecalciferol (VITAMIN D PO), Take  by mouth., Disp: , Rfl:   •  albuterol (VENTOLIN HFA) 108 (90 Base) MCG/ACT Aero Soln inhalation aerosol, Inhale 2 Puffs by mouth every 6 hours as needed., Disp: 8.5 g, Rfl: 3  •  venlafaxine (EFFEXOR-XR) 150 MG extended-release capsule, Take 1 Cap by mouth every day., Disp: 30 Cap, Rfl: 5  •  fluticasone (FLONASE) 50 MCG/ACT nasal spray, Spray 1 Spray in nose every day., Disp: 16 g, Rfl: 5  •  venlafaxine XR (EFFEXOR XR) 75 MG CAPSULE SR 24 HR, Take 1 Cap by mouth every day. (Patient not taking: Reported on 12/19/2019), Disp: 30 Cap, Rfl: 6  •  venlafaxine XR (EFFEXOR XR) 37.5 MG CAPSULE SR 24 HR, Take 1 Cap by mouth every day. (Patient not taking: Reported on 12/19/2019), Disp: 30 Cap, Rfl: 1  •  SUMAtriptan (IMITREX) 50 MG Tab, Take 1 Tab by  "mouth Once PRN for Migraine for up to 1 dose. (Patient not taking: Reported on 3/5/2019), Disp: 10 Tab, Rfl: 3  •  venlafaxine XR (EFFEXOR XR) 75 MG CAPSULE SR 24 HR, Take 1 Cap by mouth every day. (Patient not taking: Reported on 12/19/2019), Disp: 30 Cap, Rfl: 1  •  benzonatate (TESSALON) 100 MG CAPS, Take 1 Cap by mouth 3 times a day as needed for Cough. (Patient not taking: Reported on 1/4/2019), Disp: 15 Cap, Rfl: 0  ALLERGIES:   Allergies   Allergen Reactions   • Latex Unspecified     rash     SURGHX: No past surgical history on file.  SOCHX:  reports that she quit smoking about 6 years ago. She has a 5.00 pack-year smoking history. She has never used smokeless tobacco. She reports current alcohol use. She reports that she does not use drugs..  FH:   Family History   Problem Relation Age of Onset   • Other Mother         sleep walking   • Heart Attack Father    • Heart Failure Paternal Grandmother    • Heart Attack Paternal Grandfather          Physical Exam:  Vitals/ General Appearance:   Weight/BMI: Body mass index is 20.8 kg/m².  /74 (BP Location: Left arm, Patient Position: Sitting, BP Cuff Size: Adult)   Pulse 92   Resp 14   Ht 1.651 m (5' 5\")   Wt 56.7 kg (125 lb)   SpO2 96%   Vitals:    05/21/20 1435   BP: 118/74   BP Location: Left arm   Patient Position: Sitting   BP Cuff Size: Adult   Pulse: 92   Resp: 14   SpO2: 96%   Weight: 56.7 kg (125 lb)   Height: 1.651 m (5' 5\")       Pt. is alert and oriented to time, place and person. Cooperative and in no apparent distress.       Constitutional: Alert, no distress, well-groomed.  Skin: No rashes in visible areas.  Eye: Round. Conjunctiva clear, lids normal. No icterus.   Neck: No masses, no thyromegaly. Moves freely without pain.  -. Lungs auscultation: B/L good air entry, vesicular breath sounds, no adventitious sounds  -. Heart auscultation: 1st and 2nd heart sounds normal, regular rhythm. No appreciable murmur.  - Extremities: no clubbing, no " pedal edema.  - NEUROLOGICAL EXAMINATION: On neurological exam, the patient was alert and oriented x3. speech was clear and fluent without dysarthria.  ASSESSMENT AND PLAN     1Plan        - even tough the MSLT is negative for narcolepsy, I have high suspicion of narcolepsy w/ cataplexy based on her symptoms                     - hold off on ritalin XR 20 mg in morning while retrying Sunosi.Ritallin 10 mg in after PRN due to residal EDS. 3 months scripts are given        - Continue effexor  mg         - Sample for sunosi 150 mg is given to re try. Recommended to hold off on ritalin XR in mean time but ok to use immediate            release.Side effects were discussed in detail.        - continue xyrem 3.5 g twice a night           - HLA DQB1 *06:02 was negative   - Readdressed Therapeutic nap schedule of taking 20-30 min naps every 3 hours is recommended    2. Regarding treatment of other past medical problems and general health maintenance,  She is urged to follow up with PCP.      Narcotics: 140(Value from [employer] System.)    Sedatives: 331(Value from [employer] System.)    Stimulants: 140(Value from [employer].)     NARxSCORES can range from 000 to 999. This first two digits represent the composite percentile risk based on an overall analysis of prescription drug use. The third digit represents the number of active prescriptions. The distribution of scores in the population is such that approximately 75% fall below 200, 95% fall below 500 and 99% fall below 650.     SELECT THE LINK BELOW TO REVIEW THE [employer] REPORT  or  SELECT THE 'ACCEPT' BUTTON TO CONTINUTE AFTER REVIEWING THE SCORES

## 2020-08-26 ENCOUNTER — TELEMEDICINE (OUTPATIENT)
Dept: SLEEP MEDICINE | Facility: MEDICAL CENTER | Age: 40
End: 2020-08-26
Payer: COMMERCIAL

## 2020-08-26 VITALS — WEIGHT: 125 LBS | HEIGHT: 65 IN | BODY MASS INDEX: 20.83 KG/M2

## 2020-08-26 DIAGNOSIS — G47.411 NARCOLEPSY CATAPLEXY SYNDROME: ICD-10-CM

## 2020-08-26 PROCEDURE — 99214 OFFICE O/P EST MOD 30 MIN: CPT | Mod: 95,CR | Performed by: FAMILY MEDICINE

## 2020-08-26 RX ORDER — METHYLPHENIDATE HYDROCHLORIDE 30 MG/1
30 CAPSULE, EXTENDED RELEASE ORAL EVERY MORNING
Qty: 30 CAP | Refills: 0 | Status: SHIPPED | OUTPATIENT
Start: 2020-09-26 | End: 2020-10-26

## 2020-08-26 RX ORDER — METHYLPHENIDATE HYDROCHLORIDE 10 MG/1
10 TABLET ORAL
Qty: 30 TAB | Refills: 0 | Status: SHIPPED | OUTPATIENT
Start: 2020-09-26 | End: 2020-10-26

## 2020-08-26 RX ORDER — METHYLPHENIDATE HYDROCHLORIDE 10 MG/1
10 TABLET ORAL
Qty: 30 TAB | Refills: 0 | Status: SHIPPED | OUTPATIENT
Start: 2020-08-26 | End: 2020-09-25

## 2020-08-26 RX ORDER — METHYLPHENIDATE HYDROCHLORIDE 10 MG/1
10 TABLET ORAL
Qty: 30 TAB | Refills: 0 | Status: SHIPPED | OUTPATIENT
Start: 2020-09-26 | End: 2020-08-26

## 2020-08-26 RX ORDER — METHYLPHENIDATE HYDROCHLORIDE 30 MG/1
30 CAPSULE, EXTENDED RELEASE ORAL EVERY MORNING
Qty: 30 CAP | Refills: 0 | Status: SHIPPED | OUTPATIENT
Start: 2020-08-26 | End: 2020-09-25

## 2020-08-26 NOTE — PATIENT INSTRUCTIONS
Methylphenidate tablets  What is this medicine?  METHYLPHENIDATE (meth il FEN i date) is used to treat attention-deficit hyperactivity disorder (ADHD). It is also used to treat narcolepsy.  This medicine may be used for other purposes; ask your health care provider or pharmacist if you have questions.  COMMON BRAND NAME(S): Methylin, Ritalin  What should I tell my health care provider before I take this medicine?  They need to know if you have any of these conditions:  · anxiety or panic attacks  · circulation problems in fingers and toes  · glaucoma  · hardening or blockages of the arteries or heart blood vessels  · heart disease or a heart defect  · high blood pressure  · history of a drug or alcohol abuse problem  · history of stroke  · liver disease  · mental illness  · motor tics, family history or diagnosis of Tourette's syndrome  · seizures  · suicidal thoughts, plans, or attempt; a previous suicide attempt by you or a family member  · thyroid disease  · an unusual or allergic reaction to methylphenidate, other medicines, foods, dyes, or preservatives  · pregnant or trying to get pregnant  · breast-feeding  How should I use this medicine?  Take this medicine by mouth with a glass of water. Follow the directions on the prescription label. It is best to take this medicine 30 to 45 minutes before meals, unless your doctor tells you otherwise. Take your medicine at regular intervals. Usually the last dose of the day will be taken at least 4 to 6 hours before bedtime, so it will not interfere with sleep. Do not take your medicine more often than directed.  A special MedGuide will be given to you by the pharmacist with each prescription and refill. Be sure to read this information carefully each time.  Talk to your pediatrician regarding the use of this medicine in children. While this drug may be prescribed for children as young as 6 years of age for selected conditions, precautions do apply.  Overdosage: If you  think you have taken too much of this medicine contact a poison control center or emergency room at once.  NOTE: This medicine is only for you. Do not share this medicine with others.  What if I miss a dose?  If you miss a dose, take it as soon as you can. If it is almost time for your next dose, take only that dose. Do not take double or extra doses.  What may interact with this medicine?  Do not take this medicine with any of the following medications:  · lithium  · MAOIs like Carbex, Eldepryl, Marplan, Nardil, and Parnate  · other stimulant medicines for attention disorders, weight loss, or to stay awake  · procarbazine  This medicine may also interact with the following medications:  · atomoxetine  · caffeine  · certain medicines for blood pressure, heart disease, irregular heart beat  · certain medicines for depression, anxiety, or psychotic disturbances  · certain medicines for seizures like carbamazepine, phenobarbital, phenytoin  · cold or allergy medicines  · warfarin  This list may not describe all possible interactions. Give your health care provider a list of all the medicines, herbs, non-prescription drugs, or dietary supplements you use. Also tell them if you smoke, drink alcohol, or use illegal drugs. Some items may interact with your medicine.  What should I watch for while using this medicine?  Visit your doctor or health care professional for regular checks on your progress. This prescription requires that you follow special procedures with your doctor and pharmacy. You will need to have a new written prescription from your doctor or health care professional every time you need a refill.  This medicine may affect your concentration, or hide signs of tiredness. Until you know how this drug affects you, do not drive, ride a bicycle, use machinery, or do anything that needs mental alertness.  Tell your doctor or health care professional if this medicine loses its effects, or if you feel you need to  take more than the prescribed amount. Do not change the dosage without talking to your doctor or health care professional.  For males, contact your doctor or health care professional right away if you have an erection that lasts longer than 4 hours or if it becomes painful. This may be a sign of a serious problem and must be treated right away to prevent permanent damage.  Decreased appetite is a common side effect when starting this medicine. Eating small, frequent meals or snacks can help. Talk to your doctor if you continue to have poor eating habits. Height and weight growth of a child taking this medicine will be monitored closely.  Do not take this medicine close to bedtime. It may prevent you from sleeping.  If you are going to need surgery, a MRI, CT scan, or other procedure, tell your doctor that you are taking this medicine. You may need to stop taking this medicine before the procedure.  Tell your doctor or healthcare professional right away if you notice unexplained wounds on your fingers and toes while taking this medicine. You should also tell your healthcare provider if you experience numbness or pain, changes in the skin color, or sensitivity to temperature in your fingers or toes.  What side effects may I notice from receiving this medicine?  Side effects that you should report to your doctor or health care professional as soon as possible:  · allergic reactions like skin rash, itching or hives, swelling of the face, lips, or tongue  · changes in vision  · chest pain or chest tightness  · fast, irregular heartbeat  · fingers or toes feel numb, cool, painful  · hallucination, loss of contact with reality  · high blood pressure  · males: prolonged or painful erection  · seizures  · severe headaches  · shortness of breath  · suicidal thoughts or other mood changes  · trouble walking, dizziness, loss of balance or coordination  · uncontrollable head, mouth, neck, arm, or leg movements  · unusual  bleeding or bruising  Side effects that usually do not require medical attention (report to your doctor or health care professional if they continue or are bothersome):  · anxious  · headache  · loss of appetite  · nausea, vomiting  · trouble sleeping  · weight loss  This list may not describe all possible side effects. Call your doctor for medical advice about side effects. You may report side effects to FDA at 8-521-ROL-3095.  Where should I keep my medicine?  Keep out of the reach of children. This medicine can be abused. Keep your medicine in a safe place to protect it from theft. Do not share this medicine with anyone. Selling or giving away this medicine is dangerous and against the law.  This medicine may cause accidental overdose and death if taken by other adults, children, or pets. Mix any unused medicine with a substance like cat litter or coffee grounds. Then throw the medicine away in a sealed container like a sealed bag or a coffee can with a lid. Do not use the medicine after the expiration date.  Store at room temperature between 15 and 30 degrees C (59 and 86 degrees F). Protect from light and moisture. Keep container tightly closed.  NOTE: This sheet is a summary. It may not cover all possible information. If you have questions about this medicine, talk to your doctor, pharmacist, or health care provider.  © 2020 [employer] (2018-04-24 15:03:36)

## 2020-08-26 NOTE — PROGRESS NOTES
Telemedicine Visit: Established Patient     This encounter was conducted via Zoom . Verbal consent was obtained. Patient's identity was verified.       Given the importance of social distancing and other strategies recommended to reduce the risk of COVID-19 transmission, I am providing medical care to this patient via audio/video visit in place of an in person visit at the request of the patient.    [facility] Follow Up Note     Date: 8/26/2020 / Time: 3:10 PM    Patient ID:   Name:             Allison Kulkarni   YOB: 1980  Age:                 40 y.o.  female   MRN:               [id number]      Thank you for requesting a sleep medicine consultation on Allison Kulkarni at the sleep center. She presents today with the chief complaints of neelima follow up.     HISTORY OF PRESENT ILLNESS:        She goes to sleep around 9:30 pm and wakes up around 3:30-4 am. She is getting about 7-6 hrs of sleep on a good night and about 5 hr of sleep on a bad night. Overall,she does finds her sleep refreshing. She does take regular 1-2 naps. The naps are usually 30 min long.She drinks about 1 caffeinated beverages per day.        She is currently suppose to be on xyrem 4 gm twice a night. Denies recent side effects. In past he was experiencing muscle jerks and through it was due to xyrem which has resolved. For EDS she is on Ritalin ER  20 mg which takes before work at 5:30-6:30 am and ritalin 10 mg PRN in 11 am-12 pm. She was taking ritalin immediate relase onlt few days a week which has increased to on most work days. Even then she has residual EDS for the last 2 hours of the work day. She tried sunosi and as per pt she did not find it helpful but she think that it was because it was during her period when she has more than usual EDS and anxiety. Due to depression she is also on effexor  mg and has and seeing Dr. Barnard. She denies episodes of cataplexy since her las visit. Denies  hypnagogic hallucination or sleep paralysis.         REVIEW OF SYSTEMS:       Constitutional: Denies fevers, Denies weight changes  Eyes: Denies changes in vision, no eye pain  Ears/Nose/Throat/Mouth: Denies nasal congestion or sore throat   Cardiovascular: Denies chest pain or palpitations   Respiratory: Denies shortness of breath , Denies cough  Gastrointestinal/Hepatic: Denies abdominal pain, nausea, vomiting, diarrhea, constipation or GI bleeding   Genitourinary: Deniesdysuria or frequency  Musculoskeletal/Rheum: Denies  joint pain and swelling   Skin/Breast: Denies rash,   Neurological: Denies headache, confusion, memory loss or focal weakness/parasthesias  Psychiatric: denies mood disorder   Sleep: + EDS    Comprehensive review of systems form is reviewed with the patient and is attached in the EMR.     PMH:  has a past medical history of Allergy, Asthma (3/27/2015), Back pain, Bronchitis, Chickenpox, Depression, Nasal drainage, and Seasonal allergies (3/27/2015). She also has no past medical history of ASTHMA.  MEDS:   Current Outpatient Medications:   •  methylphenidate (RITALIN) 10 MG Tab, TAKE 1 TABLET BY MOUTH EVERY DAY FOR 30 DAYS G47 DNF 58/12/19, Disp: , Rfl:   •  methylphenidate (METADATE ER) 20 MG CR tablet, TAKE ONE TABLET BY MOUTH IN THE MORNING G47 **[id number]**, Disp: , Rfl:   •  Cetirizine HCl (ZYRTEC ALLERGY PO), Take  by mouth., Disp: , Rfl:   •  Sodium Oxybate (XYREM PO), Take  by mouth., Disp: , Rfl:   •  Multiple Vitamin (MULTI-VITAMIN PO), Take  by mouth., Disp: , Rfl:   •  Cholecalciferol (VITAMIN D PO), Take  by mouth., Disp: , Rfl:   •  venlafaxine (EFFEXOR-XR) 150 MG extended-release capsule, Take 1 Cap by mouth every day., Disp: 30 Cap, Rfl: 5  •  fluticasone (FLONASE) 50 MCG/ACT nasal spray, Spray 1 Spray in nose every day., Disp: 16 g, Rfl: 5  •  venlafaxine XR (EFFEXOR XR) 75 MG CAPSULE SR 24 HR, Take 1 Cap by mouth every day. (Patient not taking: Reported on 12/19/2019), Disp: 30  "Cap, Rfl: 6  •  venlafaxine XR (EFFEXOR XR) 37.5 MG CAPSULE SR 24 HR, Take 1 Cap by mouth every day. (Patient not taking: Reported on 12/19/2019), Disp: 30 Cap, Rfl: 1  •  SUMAtriptan (IMITREX) 50 MG Tab, Take 1 Tab by mouth Once PRN for Migraine for up to 1 dose. (Patient not taking: Reported on 3/5/2019), Disp: 10 Tab, Rfl: 3  •  venlafaxine XR (EFFEXOR XR) 75 MG CAPSULE SR 24 HR, Take 1 Cap by mouth every day. (Patient not taking: Reported on 12/19/2019), Disp: 30 Cap, Rfl: 1  •  albuterol (VENTOLIN HFA) 108 (90 Base) MCG/ACT Aero Soln inhalation aerosol, Inhale 2 Puffs by mouth every 6 hours as needed. (Patient not taking: Reported on 8/26/2020), Disp: 8.5 g, Rfl: 3  •  benzonatate (TESSALON) 100 MG CAPS, Take 1 Cap by mouth 3 times a day as needed for Cough. (Patient not taking: Reported on 1/4/2019), Disp: 15 Cap, Rfl: 0  ALLERGIES:   Allergies   Allergen Reactions   • Latex Unspecified     rash     SURGHX: No past surgical history on file.  SOCHX:  reports that she quit smoking about 6 years ago. She has a 5.00 pack-year smoking history. She has never used smokeless tobacco. She reports current alcohol use. She reports that she does not use drugs..  FH:   Family History   Problem Relation Age of Onset   • Other Mother         sleep walking   • Heart Attack Father    • Heart Failure Paternal Grandmother    • Heart Attack Paternal Grandfather          Physical Exam:  Vitals/ General Appearance:   Weight/BMI: Body mass index is 20.8 kg/m².  Ht 1.651 m (5' 5\")   Wt 56.7 kg (125 lb)   Vitals:    08/26/20 1458   Weight: 56.7 kg (125 lb)   Height: 1.651 m (5' 5\")       Pt. is alert and oriented to time, place and person. Cooperative and in no apparent distress.       Constitutional: Alert, no distress, well-groomed.  Skin: No rashes in visible areas.  Eye: Round. Conjunctiva clear, lids normal. No icterus.   ENMT: Lips pink without lesions, good dentition, moist mucous membranes. Phonation normal.  Neck: No masses, " no thyromegaly. Moves freely without pain.  CV: Pulse as reported by patient  Respiratory: Unlabored respiratory effort, no cough or audible wheeze  Psych: Alert and oriented x3, normal affect and mood.     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 3 months scripts are given        - Continue effexor ER 150 mg         - continue xyrem 4g twice a night            - HLA DQB1 *06:02 was negative   - Readdressed Therapeutic nap schedule of taking 20-30 min naps every 3 hours is recommended     2. Regarding treatment of other past medical problems and general health maintenance,  She is urged to follow up with PCP.

## 2020-11-13 ENCOUNTER — PATIENT MESSAGE (OUTPATIENT)
Dept: SLEEP MEDICINE | Facility: MEDICAL CENTER | Age: 40
End: 2020-11-13

## 2020-11-13 DIAGNOSIS — G47.411 NARCOLEPSY AND CATAPLEXY: ICD-10-CM

## 2020-11-13 NOTE — TELEPHONE ENCOUNTER
From: Allison Kulkarni  To: Hugh Spencer M.D.  Sent: 11/13/2020 10:44 AM PST  Subject: Prescription Question    Hi Dr Spencer,    I've been trying to call the office to reschedule the appointment I missed and get more refills on my methylphenidate, but nobody has gotten back to me. Would you be willing to send prescriptions to my pharmacy even though I missed my appointment? The new dosage seems to be working well.    Best,  Allison Kulkarni

## 2020-11-16 RX ORDER — METHYLPHENIDATE HYDROCHLORIDE 10 MG/1
10 TABLET ORAL DAILY
Qty: 30 EACH | Refills: 0 | Status: SHIPPED | OUTPATIENT
Start: 2020-11-16 | End: 2020-12-16

## 2020-11-16 RX ORDER — METHYLPHENIDATE HYDROCHLORIDE 30 MG/1
30 CAPSULE, EXTENDED RELEASE ORAL EVERY MORNING
Qty: 30 CAP | Refills: 0 | Status: SHIPPED | OUTPATIENT
Start: 2020-11-16 | End: 2020-12-16

## 2020-11-17 NOTE — PATIENT COMMUNICATION
Called pt to inform her that Dr. Spencer filled her Rx for 1 month but he is wanting me to schedule the pt for a OV for more refills. I was able to schedule the pt on 12/16/2020 with Dr. Spencer

## 2020-11-19 ENCOUNTER — TELEPHONE (OUTPATIENT)
Dept: SLEEP MEDICINE | Facility: MEDICAL CENTER | Age: 40
End: 2020-11-19

## 2020-11-19 NOTE — TELEPHONE ENCOUNTER
DOCUMENTATION OF PRIOR AUTH STATUS    1. Medication name and dose: Xyrem 500 mg/ml    2. Name and Phone # of Prescription coverage company: [employer] [phone]    3. Date Prior Auth was submitted: 11/12/2020    4. What information was given to obtain insurance decision: Clinical notes    5. Prior Auth letter Approved or Denied: Approved through 11/17/2021    6. Pharmacy notified: Yes    7. Patient notified: Yes

## 2020-11-19 NOTE — TELEPHONE ENCOUNTER
MEDICATION PRIOR AUTHORIZATION NEEDED:    1. Name of Medication: Xyrem    2. Requested By (Name of Pharmacy): [facility]     3. Is insurance on file current? yes    4. What is the name & phone number of the 3rd party payor? [employer] [phone]

## 2020-12-16 ENCOUNTER — TELEMEDICINE (OUTPATIENT)
Dept: SLEEP MEDICINE | Facility: MEDICAL CENTER | Age: 40
End: 2020-12-16
Payer: COMMERCIAL

## 2020-12-16 VITALS — BODY MASS INDEX: 22.88 KG/M2 | WEIGHT: 134 LBS | HEIGHT: 64 IN

## 2020-12-16 DIAGNOSIS — G47.411 NARCOLEPSY CATAPLEXY SYNDROME: ICD-10-CM

## 2020-12-16 PROCEDURE — 99213 OFFICE O/P EST LOW 20 MIN: CPT | Mod: 95,CR | Performed by: FAMILY MEDICINE

## 2020-12-16 RX ORDER — METHYLPHENIDATE HYDROCHLORIDE 30 MG/1
30 CAPSULE, EXTENDED RELEASE ORAL EVERY MORNING
Qty: 90 CAP | Refills: 0 | Status: SHIPPED | OUTPATIENT
Start: 2020-12-16 | End: 2021-03-16

## 2020-12-16 NOTE — PATIENT INSTRUCTIONS
Methylphenidate tablets  What is this medicine?  METHYLPHENIDATE (meth il FEN i date) is used to treat attention-deficit hyperactivity disorder (ADHD). It is also used to treat narcolepsy.  This medicine may be used for other purposes; ask your health care provider or pharmacist if you have questions.  COMMON BRAND NAME(S): Methylin, Ritalin  What should I tell my health care provider before I take this medicine?  They need to know if you have any of these conditions:  · anxiety or panic attacks  · circulation problems in fingers and toes  · glaucoma  · hardening or blockages of the arteries or heart blood vessels  · heart disease or a heart defect  · high blood pressure  · history of a drug or alcohol abuse problem  · history of stroke  · liver disease  · mental illness  · motor tics, family history or diagnosis of Tourette's syndrome  · seizures  · suicidal thoughts, plans, or attempt; a previous suicide attempt by you or a family member  · thyroid disease  · an unusual or allergic reaction to methylphenidate, other medicines, foods, dyes, or preservatives  · pregnant or trying to get pregnant  · breast-feeding  How should I use this medicine?  Take this medicine by mouth with a glass of water. Follow the directions on the prescription label. It is best to take this medicine 30 to 45 minutes before meals, unless your doctor tells you otherwise. Take your medicine at regular intervals. Usually the last dose of the day will be taken at least 4 to 6 hours before bedtime, so it will not interfere with sleep. Do not take your medicine more often than directed.  A special MedGuide will be given to you by the pharmacist with each prescription and refill. Be sure to read this information carefully each time.  Talk to your pediatrician regarding the use of this medicine in children. While this drug may be prescribed for children as young as 6 years of age for selected conditions, precautions do apply.  Overdosage: If you  think you have taken too much of this medicine contact a poison control center or emergency room at once.  NOTE: This medicine is only for you. Do not share this medicine with others.  What if I miss a dose?  If you miss a dose, take it as soon as you can. If it is almost time for your next dose, take only that dose. Do not take double or extra doses.  What may interact with this medicine?  Do not take this medicine with any of the following medications:  · lithium  · MAOIs like Carbex, Eldepryl, Marplan, Nardil, and Parnate  · other stimulant medicines for attention disorders, weight loss, or to stay awake  · procarbazine  This medicine may also interact with the following medications:  · atomoxetine  · caffeine  · certain medicines for blood pressure, heart disease, irregular heart beat  · certain medicines for depression, anxiety, or psychotic disturbances  · certain medicines for seizures like carbamazepine, phenobarbital, phenytoin  · cold or allergy medicines  · warfarin  This list may not describe all possible interactions. Give your health care provider a list of all the medicines, herbs, non-prescription drugs, or dietary supplements you use. Also tell them if you smoke, drink alcohol, or use illegal drugs. Some items may interact with your medicine.  What should I watch for while using this medicine?  Visit your doctor or health care professional for regular checks on your progress. This prescription requires that you follow special procedures with your doctor and pharmacy. You will need to have a new written prescription from your doctor or health care professional every time you need a refill.  This medicine may affect your concentration, or hide signs of tiredness. Until you know how this drug affects you, do not drive, ride a bicycle, use machinery, or do anything that needs mental alertness.  Tell your doctor or health care professional if this medicine loses its effects, or if you feel you need to  take more than the prescribed amount. Do not change the dosage without talking to your doctor or health care professional.  For males, contact your doctor or health care professional right away if you have an erection that lasts longer than 4 hours or if it becomes painful. This may be a sign of a serious problem and must be treated right away to prevent permanent damage.  Decreased appetite is a common side effect when starting this medicine. Eating small, frequent meals or snacks can help. Talk to your doctor if you continue to have poor eating habits. Height and weight growth of a child taking this medicine will be monitored closely.  Do not take this medicine close to bedtime. It may prevent you from sleeping.  If you are going to need surgery, a MRI, CT scan, or other procedure, tell your doctor that you are taking this medicine. You may need to stop taking this medicine before the procedure.  Tell your doctor or healthcare professional right away if you notice unexplained wounds on your fingers and toes while taking this medicine. You should also tell your healthcare provider if you experience numbness or pain, changes in the skin color, or sensitivity to temperature in your fingers or toes.  What side effects may I notice from receiving this medicine?  Side effects that you should report to your doctor or health care professional as soon as possible:  · allergic reactions like skin rash, itching or hives, swelling of the face, lips, or tongue  · changes in vision  · chest pain or chest tightness  · fast, irregular heartbeat  · fingers or toes feel numb, cool, painful  · hallucination, loss of contact with reality  · high blood pressure  · males: prolonged or painful erection  · seizures  · severe headaches  · shortness of breath  · suicidal thoughts or other mood changes  · trouble walking, dizziness, loss of balance or coordination  · uncontrollable head, mouth, neck, arm, or leg movements  · unusual  bleeding or bruising  Side effects that usually do not require medical attention (report to your doctor or health care professional if they continue or are bothersome):  · anxious  · headache  · loss of appetite  · nausea, vomiting  · trouble sleeping  · weight loss  This list may not describe all possible side effects. Call your doctor for medical advice about side effects. You may report side effects to FDA at 3-428-HVO-6833.  Where should I keep my medicine?  Keep out of the reach of children. This medicine can be abused. Keep your medicine in a safe place to protect it from theft. Do not share this medicine with anyone. Selling or giving away this medicine is dangerous and against the law.  This medicine may cause accidental overdose and death if taken by other adults, children, or pets. Mix any unused medicine with a substance like cat litter or coffee grounds. Then throw the medicine away in a sealed container like a sealed bag or a coffee can with a lid. Do not use the medicine after the expiration date.  Store at room temperature between 15 and 30 degrees C (59 and 86 degrees F). Protect from light and moisture. Keep container tightly closed.  NOTE: This sheet is a summary. It may not cover all possible information. If you have questions about this medicine, talk to your doctor, pharmacist, or health care provider.  © 2020 [employer] (2018-04-24 15:03:36)

## 2021-03-30 ENCOUNTER — OFFICE VISIT (OUTPATIENT)
Dept: SLEEP MEDICINE | Facility: MEDICAL CENTER | Age: 41
End: 2021-03-30
Payer: COMMERCIAL

## 2021-03-30 VITALS
OXYGEN SATURATION: 98 % | HEIGHT: 65 IN | BODY MASS INDEX: 24.32 KG/M2 | HEART RATE: 80 BPM | RESPIRATION RATE: 16 BRPM | WEIGHT: 146 LBS | SYSTOLIC BLOOD PRESSURE: 122 MMHG | DIASTOLIC BLOOD PRESSURE: 82 MMHG

## 2021-03-30 DIAGNOSIS — G47.411 NARCOLEPSY CATAPLEXY SYNDROME: ICD-10-CM

## 2021-03-30 PROCEDURE — 99214 OFFICE O/P EST MOD 30 MIN: CPT | Performed by: FAMILY MEDICINE

## 2021-03-30 RX ORDER — METHYLPHENIDATE HYDROCHLORIDE 20 MG/1
20 TABLET ORAL EVERY MORNING
Qty: 90 TABLET | Refills: 0 | Status: SHIPPED | OUTPATIENT
Start: 2021-03-30 | End: 2021-06-28

## 2021-03-30 NOTE — PROGRESS NOTES
[facility] Follow Up Note     Date: 3/30/2021 / Time: 2:28 PM    Patient ID:   Name:             Allison Kulkarni   YOB: 1980  Age:                 41 y.o.  female   MRN:               [id number]      Thank you for requesting a sleep medicine consultation on Allison Kulkarni at the sleep center. She presents today with the chief complaints of narcolepsy with cataplexy follow up.     HISTORY OF PRESENT ILLNESS:        She goes to sleep around 8:30 pm and wakes up around 5:30-6:30 am. She is getting about 6.5 hrs of sleep on a good night and about 4-5 hr of sleep on a bad night. Overall, she doesnot finds her sleep refreshing.  She occasionally takes a nap.She denies any symptoms of RLS, narcolepsy or any symptoms to suggest parasomnias such as nightmares, sleep walking or acting out of dreams.    She is currently suppose to be on xyrem 3.5 gm twice a night. she as decreased the dose since last visit due to worsening of depression. However denies SI/HI.. In past he was experiencing muscle jerks and through it was due to xyrem which has resolved. She is was on Ritalin ER was increased to 30 mg which takes before work at 6-7 am and ritalin 10 mg PRN in 11 am-12 pm. However she as been out of both doses of ritalin. She feels like her anxiety and insomnia is nbetter since she has been off of the ritalin but the EDS is the same.  She tried sunosi and as per pt she did not find it helpful but she think that it was because it was during her period when she has more than usual EDS and anxiety. Due to depression she is also on effexor  mg and has and seeing Dr. Barnard. She denies episodes of cataplexy since her las visit. Denies hypnagogic hallucination or sleep paralysis. EPWORTH 12/24.      SLEEP HISTORY   Normal apnea hypopnea index of 0.3/hr. There was a total of 0 apneas and 2 hypopneas. In the supine position the respiratory disturbance index was 0 an hour and in the  non-supine position the respiratory disturbance index was 1.1 per hour. The respiratory events were associated with O2 nakul of 93% and average O2 saturation of 96%. She spent 0 % of sleep time below 89% O2 saturation. MSLT showed sleep latency of 5:15 min however no SOPREMPs was seen. The REM latency on PSG was normal as well. She has anxiety disorder and currently on effexor and remeron. She did not have wash out period before her sleep studies      REVIEW OF SYSTEMS:       Constitutional: Denies fevers, Denies weight changes  Eyes: Denies changes in vision, no eye pain  Ears/Nose/Throat/Mouth: Denies nasal congestion or sore throat   Cardiovascular: Denies chest pain or palpitations   Respiratory: Denies shortness of breath , Denies cough  Gastrointestinal/Hepatic: Denies abdominal pain, nausea, vomiting  Musculoskeletal/Rheum: Denies  joint pain and swelling   Skin/Breast: Denies rash,   Neurological: Denies headache, confusion, memory loss or focal weakness/parasthesias  Psychiatric: denies mood disorder   Sleep: denies snoring    Comprehensive review of systems form is reviewed with the patient and is attached in the EMR.     PMH:  has a past medical history of Allergy, Asthma (3/27/2015), Back pain, Bronchitis, Chickenpox, Depression, Nasal drainage, and Seasonal allergies (3/27/2015). She also has no past medical history of ASTHMA.  MEDS:   Current Outpatient Medications:   •  methylphenidate (METADATE ER) 20 MG CR tablet, TAKE ONE TABLET BY MOUTH IN THE MORNING G47 **[id number]**, Disp: , Rfl:   •  Cetirizine HCl (ZYRTEC ALLERGY PO), Take  by mouth., Disp: , Rfl:   •  Sodium Oxybate (XYREM PO), Take  by mouth., Disp: , Rfl:   •  Multiple Vitamin (MULTI-VITAMIN PO), Take  by mouth., Disp: , Rfl:   •  venlafaxine (EFFEXOR-XR) 150 MG extended-release capsule, Take 1 Cap by mouth every day., Disp: 30 Cap, Rfl: 5  •  fluticasone (FLONASE) 50 MCG/ACT nasal spray, Spray 1 Spray in nose every day., Disp: 16 g, Rfl:  "5  •  venlafaxine XR (EFFEXOR XR) 75 MG CAPSULE SR 24 HR, Take 1 Cap by mouth every day. (Patient not taking: Reported on 12/19/2019), Disp: 30 Cap, Rfl: 6  •  venlafaxine XR (EFFEXOR XR) 37.5 MG CAPSULE SR 24 HR, Take 1 Cap by mouth every day. (Patient not taking: Reported on 12/19/2019), Disp: 30 Cap, Rfl: 1  •  SUMAtriptan (IMITREX) 50 MG Tab, Take 1 Tab by mouth Once PRN for Migraine for up to 1 dose. (Patient not taking: Reported on 3/5/2019), Disp: 10 Tab, Rfl: 3  •  venlafaxine XR (EFFEXOR XR) 75 MG CAPSULE SR 24 HR, Take 1 Cap by mouth every day. (Patient not taking: Reported on 12/19/2019), Disp: 30 Cap, Rfl: 1  •  Cholecalciferol (VITAMIN D PO), Take  by mouth., Disp: , Rfl:   •  albuterol (VENTOLIN HFA) 108 (90 Base) MCG/ACT Aero Soln inhalation aerosol, Inhale 2 Puffs by mouth every 6 hours as needed. (Patient not taking: Reported on 8/26/2020), Disp: 8.5 g, Rfl: 3  •  benzonatate (TESSALON) 100 MG CAPS, Take 1 Cap by mouth 3 times a day as needed for Cough. (Patient not taking: Reported on 1/4/2019), Disp: 15 Cap, Rfl: 0  ALLERGIES:   Allergies   Allergen Reactions   • Latex Unspecified     rash     SURGHX: History reviewed. No pertinent surgical history.  SOCHX:  reports that she quit smoking about 7 years ago. She has a 5.00 pack-year smoking history. She has never used smokeless tobacco. She reports current alcohol use. She reports that she does not use drugs..  FH:   Family History   Problem Relation Age of Onset   • Other Mother         sleep walking   • Heart Attack Father    • Heart Failure Paternal Grandmother    • Heart Attack Paternal Grandfather          Physical Exam:  Vitals/ General Appearance:   Weight/BMI: Body mass index is 24.3 kg/m².  /82 (BP Location: Right arm, Patient Position: Sitting, BP Cuff Size: Adult)   Pulse 80   Resp 16   Ht 1.651 m (5' 5\")   Wt 66.2 kg (146 lb)   SpO2 98%   Vitals:    03/30/21 1425   BP: 122/82   BP Location: Right arm   Patient Position: " "Sitting   BP Cuff Size: Adult   Pulse: 80   Resp: 16   SpO2: 98%   Weight: 66.2 kg (146 lb)   Height: 1.651 m (5' 5\")       Pt. is alert and oriented to time, place and person. Cooperative and in no apparent distress.       Constitutional: Alert, no distress, well-groomed.  Skin: No rashes in visible areas.  Eye: Round. Conjunctiva clear, lids normal. No icterus.   ENMT: Lips pink without lesions, good dentition, moist mucous membranes. Phonation normal.  Neck: No masses, no thyromegaly. Moves freely without pain.  CV: Pulse as reported by patient  Respiratory: Unlabored respiratory effort, no cough or audible wheeze  Psych: Alert and oriented x3, normal affect and mood.     ASSESSMENT AND PLAN     Plan        - even tough the MSLT is negative for narcolepsy, I have high suspicion of narcolepsy w/ cataplexy based on her symptoms. It is likely negative due to the use of SSRI at the time of the study               - hold off on Ritalin ER 30 mg in am. changing regiment to Ritallin 20 mg PRN  Ritali is filled for 90 day supply        - Continue effexor ER 150 mg         - changing from xyrem to xywav 3.5 g twice a night            - HLA DQB1 *06:02 was negative   - Readdressed Therapeutic nap schedule of taking 20-30 min naps every 3 hours is recommended  "

## 2021-04-01 ENCOUNTER — TELEPHONE (OUTPATIENT)
Dept: SLEEP MEDICINE | Facility: MEDICAL CENTER | Age: 41
End: 2021-04-01

## 2021-04-01 NOTE — TELEPHONE ENCOUNTER
MEDICATION PRIOR AUTHORIZATION NEEDED:    1. Name of Medication: Xywav 0.5 g/ml    2. Requested By (Name of Pharmacy): [facility]     3. Is insurance on file current? yes    4. What is the name & phone number of the 3rd party payor? [employer] [phone]

## 2021-04-05 NOTE — TELEPHONE ENCOUNTER
DOCUMENTATION OF PRIOR AUTH STATUS    1. Medication name and dose: Xywav 0.5 g/ml    2. Name and Phone # of Prescription coverage company: [email or web address] [phone]    3. Date Prior Auth was submitted: 3/31/2021    4. What information was given to obtain insurance decision: Clinical notes, sleep studies    5. Prior Auth letter Approved or Denied: Denied    6. Pharmacy notified: No    7. Patient notified: No      The Xywav was denied because the MSLT does not show Narcolepsy as far as I can tell.  Please advise, thank you.

## 2021-04-05 NOTE — TELEPHONE ENCOUNTER
Hugh Spencer M.D.  You 40 minutes ago (2:50 PM)     This is just switching her from xyrem to xywave. Please use the previous notes around the prior auth for xyrem. If not please set up a P2P . Thank you.

## 2021-04-05 NOTE — TELEPHONE ENCOUNTER
I sent the same notes and sleep studies as I did for the Xyrem approval.  I called [employer] and was told that I need to do an appeal before a peer to peer can be done.   I will start on the appeal.

## 2021-06-01 NOTE — TELEPHONE ENCOUNTER
Called [facility] again and asked for a Supervisor.  After a long wait, I got a supervisor.  Turns out that they do handle appeals for oral medication.  They just don't have a copy of my appeal as [employer] insisted that I fax the appeal to a number that is no good for appeals.  I now had to refax the appeal to the number that [facility] gave.  Should have an answer within 5-10 days.

## 2021-06-01 NOTE — TELEPHONE ENCOUNTER
I was given another bad fax number.  I have now called [facility] 8 times and have been given 3 bad fax numbers for this appeal.

## 2021-06-01 NOTE — TELEPHONE ENCOUNTER
Trying to get an answer on the appeal has been more than frustrating.  Called [employer] [facility] and held for 20 min.  Was told that I need to call [place] [employer] for an answer.  Called [employer] and was told that they in no way handle appeals for oral medication.  Told to call [employer] back and ask for a supervisor.  Called and held for another 15 min.  Could not understand the person.  He said that he would call me back and never did.  Called [employer] again and was told to call [employer] back.  I told her no and asked for a supervisor.  Unfortunately, they do not have supervisors there at the business.  One is supposed to call me back in 24 hours.  Right!

## 2021-07-20 ENCOUNTER — APPOINTMENT (OUTPATIENT)
Dept: SLEEP MEDICINE | Facility: MEDICAL CENTER | Age: 41
End: 2021-07-20
Payer: COMMERCIAL

## 2021-07-30 ENCOUNTER — SLEEP CENTER VISIT (OUTPATIENT)
Dept: SLEEP MEDICINE | Facility: MEDICAL CENTER | Age: 41
End: 2021-07-30
Payer: COMMERCIAL

## 2021-07-30 VITALS
DIASTOLIC BLOOD PRESSURE: 82 MMHG | HEART RATE: 92 BPM | BODY MASS INDEX: 24.49 KG/M2 | SYSTOLIC BLOOD PRESSURE: 132 MMHG | RESPIRATION RATE: 16 BRPM | OXYGEN SATURATION: 98 % | WEIGHT: 147 LBS | HEIGHT: 65 IN

## 2021-07-30 DIAGNOSIS — G47.411 NARCOLEPSY CATAPLEXY SYNDROME: ICD-10-CM

## 2021-07-30 PROCEDURE — 99213 OFFICE O/P EST LOW 20 MIN: CPT | Performed by: FAMILY MEDICINE

## 2021-07-30 RX ORDER — METHYLPHENIDATE HYDROCHLORIDE EXTENDED RELEASE 20 MG/1
20 TABLET ORAL
Qty: 90 TABLET | Refills: 0 | Status: SHIPPED | OUTPATIENT
Start: 2021-07-30 | End: 2021-10-28

## 2021-07-30 RX ORDER — (CALCIUM, MAGNESIUM, POTASSIUM, AND SODIUM OXYBATES) .5; .5; .5; .5 G/ML; G/ML; G/ML; G/ML
SOLUTION ORAL
COMMUNITY
Start: 2021-07-22

## 2021-07-30 NOTE — PROGRESS NOTES
[facility] Follow Up Note     Date: 7/30/2021 / Time: 2:22 PM    Patient ID:   Name:             Allison Kulkarni   YOB: 1980  Age:                 41 y.o.  female   MRN:               [id number]      Thank you for requesting a sleep medicine consultation on Allison Kulkarni at the sleep center. She presents today with the chief complaints of MARTI follow up.     HISTORY OF PRESENT ILLNESS:        She goes to sleep around 8:30 pm and wakes up around 5:15 am. She is getting about 6.5 hrs of sleep on a good night and about 4-5 hr of sleep on a bad night. Overall, she doesnot finds her sleep refreshing.  She occasionally takes a nap.She denies any symptoms of RLS or any symptoms to suggest parasomnias such as nightmares, sleep walking or acting out of dreams.     She is currently suppose to be on xywave 3.5 gm twice a night. she as decreased the dose since last visit due to worsening of depression. However denies SI/HI.. In past he was experiencing muscle jerks and through it was due to xyrem which has resolved. She is was on Ritalin 20 mg.she was previously on extended release Ritalin however she feels like her anxiety and insomnia is better since she has been on regular Ritalin as needed.  She tried sunosi and as per pt she did not find it helpful but she think that it was because it was during her period when she has more than usual EDS and anxiety. Due to depression she is also on effexor  mg and has and seeing Dr. Barnard. She has been experiencing cataplexy since her las visit due to increased stress at work.  Symptoms include numb tounge and hands. Denies hypnagogic hallucination or sleep paralysis.  She does not have an active 's license and does not drive.        SLEEP HISTORY   Normal apnea hypopnea index of 0.3/hr. There was a total of 0 apneas and 2 hypopneas. In the supine position the respiratory disturbance index was 0 an hour and in the non-supine  position the respiratory disturbance index was 1.1 per hour. The respiratory events were associated with O2 nakul of 93% and average O2 saturation of 96%. She spent 0 % of sleep time below 89% O2 saturation. MSLT showed sleep latency of 5:15 min however no SOPREMPs was seen. The REM latency on PSG was normal as well. She has anxiety disorder and currently on effexor and remeron. She did not have wash out period before her sleep studies         REVIEW OF SYSTEMS:       Constitutional: Denies fevers, Denies weight changes  Eyes: Denies changes in vision, no eye pain  Ears/Nose/Throat/Mouth: Denies nasal congestion or sore throat   Cardiovascular: Denies chest pain or palpitations   Respiratory: Denies shortness of breath , Denies cough  Gastrointestinal/Hepatic: Denies abdominal pain, nausea, vomiting, diarrhea, constipation or GI bleeding   Genitourinary: Deniesdysuria or frequency  Musculoskeletal/Rheum: Denies  joint pain and swelling   Skin/Breast: Denies rash,   Neurological: Denies headache, confusion, memory loss or focal weakness/parasthesias  Psychiatric: denies mood disorder   Sleep: + cataplexy, stable EDS     Comprehensive review of systems form is reviewed with the patient and is attached in the EMR.     PMH:  has a past medical history of Allergy, Asthma (3/27/2015), Back pain, Bronchitis, Chickenpox, Depression, Nasal drainage, and Seasonal allergies (3/27/2015). She also has no past medical history of ASTHMA.  MEDS:   Current Outpatient Medications:   •  XYWAV 500 MG/ML Solution, , Disp: , Rfl:   •  methylphenidate (METADATE ER) 20 MG CR tablet, TAKE ONE TABLET BY MOUTH IN THE MORNING G47 **[id number]**, Disp: , Rfl:   •  Cetirizine HCl (ZYRTEC ALLERGY PO), Take  by mouth., Disp: , Rfl:   •  Multiple Vitamin (MULTI-VITAMIN PO), Take  by mouth., Disp: , Rfl:   •  fluticasone (FLONASE) 50 MCG/ACT nasal spray, Spray 1 Spray in nose every day., Disp: 16 g, Rfl: 5  •  venlafaxine XR (EFFEXOR XR) 75 MG CAPSULE  SR 24 HR, Take 1 Cap by mouth every day. (Patient not taking: Reported on 12/19/2019), Disp: 30 Cap, Rfl: 6  •  venlafaxine XR (EFFEXOR XR) 37.5 MG CAPSULE SR 24 HR, Take 1 Cap by mouth every day. (Patient not taking: Reported on 12/19/2019), Disp: 30 Cap, Rfl: 1  •  SUMAtriptan (IMITREX) 50 MG Tab, Take 1 Tab by mouth Once PRN for Migraine for up to 1 dose. (Patient not taking: Reported on 3/5/2019), Disp: 10 Tab, Rfl: 3  •  Sodium Oxybate (XYREM PO), Take  by mouth. (Patient not taking: Reported on 7/30/2021), Disp: , Rfl:   •  venlafaxine XR (EFFEXOR XR) 75 MG CAPSULE SR 24 HR, Take 1 Cap by mouth every day. (Patient not taking: Reported on 12/19/2019), Disp: 30 Cap, Rfl: 1  •  Cholecalciferol (VITAMIN D PO), Take  by mouth. (Patient not taking: Reported on 7/30/2021), Disp: , Rfl:   •  albuterol (VENTOLIN HFA) 108 (90 Base) MCG/ACT Aero Soln inhalation aerosol, Inhale 2 Puffs by mouth every 6 hours as needed. (Patient not taking: Reported on 8/26/2020), Disp: 8.5 g, Rfl: 3  •  venlafaxine (EFFEXOR-XR) 150 MG extended-release capsule, Take 1 Cap by mouth every day. (Patient not taking: Reported on 7/30/2021), Disp: 30 Cap, Rfl: 5  •  benzonatate (TESSALON) 100 MG CAPS, Take 1 Cap by mouth 3 times a day as needed for Cough. (Patient not taking: Reported on 1/4/2019), Disp: 15 Cap, Rfl: 0  ALLERGIES:   Allergies   Allergen Reactions   • Latex Unspecified     rash     SURGHX: No past surgical history on file.  SOCHX:  reports that she quit smoking about 7 years ago. She has a 5.00 pack-year smoking history. She has never used smokeless tobacco. She reports current alcohol use. She reports that she does not use drugs..  FH:   Family History   Problem Relation Age of Onset   • Other Mother         sleep walking   • Heart Attack Father    • Heart Failure Paternal Grandmother    • Heart Attack Paternal Grandfather          Physical Exam:  Vitals/ General Appearance:   Weight/BMI: Body mass index is 24.46 kg/m².  BP  "132/82 (BP Location: Right arm, Patient Position: Sitting, BP Cuff Size: Adult)   Pulse 92   Resp 16   Ht 1.651 m (5' 5\")   Wt 66.7 kg (147 lb)   SpO2 98%   Vitals:    07/30/21 1411   BP: 132/82   BP Location: Right arm   Patient Position: Sitting   BP Cuff Size: Adult   Pulse: 92   Resp: 16   SpO2: 98%   Weight: 66.7 kg (147 lb)   Height: 1.651 m (5' 5\")       Pt. is alert and oriented to time, place and person. Cooperative and in no apparent distress.       Constitutional: Alert, no distress, well-groomed.  Skin: No rashes in visible areas.  Eye: Round. Conjunctiva clear, lids normal. No icterus.   ENMT: Lips pink without lesions, good dentition, moist mucous membranes. Phonation normal.  Neck: No masses, no thyromegaly. Moves freely without pain.  CV: Pulse as reported by patient  Respiratory: Unlabored respiratory effort, no cough or audible wheeze  Psych: Alert and oriented x3, normal affect and mood.     ASSESSMENT AND PLAN    Narcolepsy with cataplexy    Plan        - even tough the MSLT is negative for narcolepsy, I have high suspicion of narcolepsy w/ cataplexy based on her symptoms. It is likely negative due to the use of SSRI at the time of the study               -Continue Ritalin 20 mg daily as needed        - Continue effexor ER 150 mg         - continue xyrem 3.5 twice a night            - HLA DQB1 *06:02 was negative   - Readdressed Therapeutic nap schedule of taking 20-30 min naps every 3 hours is recommended       2. Regarding treatment of other past medical problems and general health maintenance,  She is urged to follow up with PCP.    "

## 2021-08-26 ENCOUNTER — TELEPHONE (OUTPATIENT)
Dept: SLEEP MEDICINE | Facility: MEDICAL CENTER | Age: 41
End: 2021-08-26

## 2021-08-26 NOTE — TELEPHONE ENCOUNTER
MEDICATION PRIOR AUTHORIZATION NEEDED:    1. Name of Medication: Xywav 500 mg/ml    2. Requested By (Name of Pharmacy): [facility]     3. Is insurance on file current? yes    4. What is the name & phone number of the 3rd party payor? [employer] [phone]

## 2021-09-03 NOTE — TELEPHONE ENCOUNTER
DOCUMENTATION OF PRIOR AUTH STATUS    1. Medication name and dose: Xywav 500 mg/ml    2. Name and Phone # of Prescription coverage company: [employer]     3. Date Prior Auth was submitted: 8/25/2021    4. What information was given to obtain insurance decision: Clinical notes    5. Prior Auth letter Approved or Denied: Approved    6. Pharmacy notified: Yes    7. Patient notified: Yes

## 2021-09-03 NOTE — TELEPHONE ENCOUNTER
DOCUMENTATION OF PRIOR AUTH STATUS    1. Medication name and dose: Xywav 500 mg/ml    2. Name and Phone # of Prescription coverage company: [employer]     3. Date Prior Auth was submitted: 4/5/2021    4. What information was given to obtain insurance decision: Clinical notes, etc    5. Prior Auth letter Approved or Denied: Approved    6. Pharmacy notified: Yes    7. Patient notified: Yes

## 2021-09-14 ENCOUNTER — TELEPHONE (OUTPATIENT)
Dept: SLEEP MEDICINE | Facility: MEDICAL CENTER | Age: 41
End: 2021-09-14

## 2021-09-14 NOTE — TELEPHONE ENCOUNTER
Jeannette from [facility] pharmacy called and stated that the paperwork they received was Xyrrem but DX code for Xywav. They are needing to clarify what medication is needed.    [phone], option 3 and option 4

## 2021-09-16 NOTE — TELEPHONE ENCOUNTER
I was about to call them to advise pt is on Xyrem, however the notes state both.    Pt confirms she takes Xywav.    Will advise pharmacy, they state they are just waiting on new new Rx form for Xywav.

## 2024-12-29 ENCOUNTER — OFFICE VISIT (OUTPATIENT)
Dept: URGENT CARE | Facility: CLINIC | Age: 44
End: 2024-12-29
Payer: COMMERCIAL

## 2024-12-29 VITALS
TEMPERATURE: 98.4 F | OXYGEN SATURATION: 96 % | WEIGHT: 157.2 LBS | HEART RATE: 70 BPM | SYSTOLIC BLOOD PRESSURE: 120 MMHG | BODY MASS INDEX: 26.19 KG/M2 | DIASTOLIC BLOOD PRESSURE: 78 MMHG | RESPIRATION RATE: 20 BRPM | HEIGHT: 65 IN

## 2024-12-29 DIAGNOSIS — S61.258A CAT BITE OF INDEX FINGER, INITIAL ENCOUNTER: ICD-10-CM
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
## 2024-12-29 ASSESSMENT — ENCOUNTER SYMPTOMS
CONSTITUTIONAL NEGATIVE: 1
NEUROLOGICAL NEGATIVE: 1

## 2024-12-29 ASSESSMENT — VISUAL ACUITY: OU: 1

## 2024-12-29 NOTE — PROGRESS NOTES
Subjective:     Allison Kulkarni is a 44 y.o. female who presents for Other (Cat bite happened yesterday )       Animal Bite  This is a new problem. The problem has been gradually worsening.     Ambient listening software (Truly) used during this encounter with the consent of the patient. The following text is AI generated:    History of Present Illness  The patient presents for evaluation of a cat bite.    Cat Bite on Right Index Finger  She sustained the injury yesterday while  a friend's domestic house cat. The bite on the right index finger shows redness, swelling, and pain. He reports decreased range of motion but can fully extend the finger. Sensation at the fingertip is intact. The cat's vaccinations, including rabies, are up-to-date. Her tetanus vaccination is overdue, which is his primary concern. She does not report fever. She recalls the cat's tooth becoming lodged in his finger, resulting in a deep puncture wound (tooth since has dislodged). She promptly cleaned the wound.  - Onset: Yesterday.  - Location: Right index finger.  - Character: Redness, swelling, pain, decreased range of motion, deep puncture wound.  - Alleviating/Aggravating Factors: Promptly cleaned the wound.  - Severity: Decreased range of motion but can fully extend the finger; no fever; primary concern is overdue tetanus vaccination and potential amoxicillin allergy.    FAMILY HISTORY  - Mother allergic to amoxicillin    ALLERGIES  - No known allergies    IMMUNIZATIONS  - Overdue for tetanus shot    Review of Systems   Constitutional: Negative.    Skin:         Cat bite R index finger, redness, swelling   Neurological: Negative.    All other systems reviewed and are negative.    Refer to HPI for additional details.    During this visit, appropriate PPE was worn, and hand hygiene was performed.    PMH:  has a past medical history of Allergy, Asthma (3/27/2015), Back pain, Bronchitis, Chickenpox, Depression,  "Nasal drainage, and Seasonal allergies (3/27/2015).    MEDS:   Current Outpatient Medications:     amoxicillin-clavulanate (AUGMENTIN) 875-125 MG Tab, Take 1 Tablet by mouth 2 times a day for 7 days., Disp: 14 Tablet, Rfl: 0    XYWAV 500 MG/ML Solution, , Disp: , Rfl:     Cetirizine HCl (ZYRTEC ALLERGY PO), Take  by mouth., Disp: , Rfl:     Multiple Vitamin (MULTI-VITAMIN PO), Take  by mouth., Disp: , Rfl:     fluticasone (FLONASE) 50 MCG/ACT nasal spray, Spray 1 Spray in nose every day., Disp: 16 g, Rfl: 5    ALLERGIES:   Allergies   Allergen Reactions    Latex Unspecified     rash     SURGHX: History reviewed. No pertinent surgical history.  SOCHX:  reports that she quit smoking about 10 years ago. Her smoking use included cigarettes. She started smoking about 20 years ago. She has a 5 pack-year smoking history. She has never used smokeless tobacco. She reports current alcohol use. She reports that she does not use drugs.    FH: Per HPI as applicable/pertinent.      Objective:     /78 (BP Location: Right arm, Patient Position: Sitting)   Pulse 70   Temp 36.9 °C (98.4 °F) (Temporal)   Resp 20   Ht 1.651 m (5' 5\")   Wt 71.3 kg (157 lb 3.2 oz)   SpO2 96%   BMI 26.16 kg/m²     Physical Exam  Nursing note reviewed.   Constitutional:       General: She is not in acute distress.     Appearance: She is well-developed. She is not ill-appearing or toxic-appearing.   Eyes:      General: Vision grossly intact.   Cardiovascular:      Rate and Rhythm: Normal rate.   Pulmonary:      Effort: Pulmonary effort is normal. No respiratory distress.   Musculoskeletal:         General: No deformity.      Right hand: Swelling and laceration present. No deformity or bony tenderness. Decreased range of motion. Normal strength. Normal sensation. Normal capillary refill.      Comments: R index finger: Cat bite puncture wounds at dorsal and volar aspects of proximal phalanx, with redness and swelling, mild TTP, mildly decreased " range of motion due to swelling, no red streaking, no fluctuance, no discharge; distal NVI   Skin:     General: Skin is warm and dry.      Coloration: Skin is not pale.      Findings: Erythema present.   Neurological:      Mental Status: She is alert and oriented to person, place, and time.      Motor: No weakness.   Psychiatric:         Behavior: Behavior normal. Behavior is cooperative.       Assessment/Plan:     1. Cat bite of index finger, initial encounter  - Tdap =>8yo IM  - amoxicillin-clavulanate (AUGMENTIN) 875-125 MG Tab; Take 1 Tablet by mouth 2 times a day for 7 days.  Dispense: 14 Tablet; Refill: 0    Rx as above sent electronically.    Discussed choice of antibiotic.  Patient reports her mother likely had severe reaction to amoxicillin.  Patient reports she was young at the time and recalls her mother was hospitalized after taking amoxicillin.  Patient cannot definitively say if she has taken amoxicillin herself.  I did advise of alternative antibiotic regimens for cat bite and cellulitis associated with cat bite.  However, patient would like to avoid dual antibiotic therapy and wishes to continue with monotherapy with Augmentin.  Reports being treated many times for sinus infections in the past and is confident that she has taken amoxicillin.  Advised to monitor.  If symptoms of allergic reaction occur, advised to stop medication, take Benadryl, and seek immediate medical attention.    Advised to continue with basic wound care.  Tdap updated today.    Monitor. Warning signs reviewed. Strict return/ER precautions advised.    Differential diagnosis, natural history, supportive care, over-the-counter symptom management per 's instructions, close monitoring, and indications for immediate follow-up discussed.     All questions answered. Patient agrees with the plan of care.    Discharge summary provided via [employer].

## 2025-08-07 PROCEDURE — RXMED WILLOW AMBULATORY MEDICATION CHARGE

## 2025-08-10 ENCOUNTER — PHARMACY VISIT (OUTPATIENT)
Dept: PHARMACY | Facility: MEDICAL CENTER | Age: 45
End: 2025-08-10
Payer: COMMERCIAL

## 2025-08-19 PROCEDURE — RXMED WILLOW AMBULATORY MEDICATION CHARGE

## 2025-08-24 ENCOUNTER — PHARMACY VISIT (OUTPATIENT)
Dept: PHARMACY | Facility: MEDICAL CENTER | Age: 45
End: 2025-08-24
Payer: COMMERCIAL

## 2025-08-24 PROCEDURE — RXMED WILLOW AMBULATORY MEDICATION CHARGE
